# Patient Record
Sex: FEMALE | Race: OTHER | NOT HISPANIC OR LATINO | URBAN - METROPOLITAN AREA
[De-identification: names, ages, dates, MRNs, and addresses within clinical notes are randomized per-mention and may not be internally consistent; named-entity substitution may affect disease eponyms.]

---

## 2022-01-24 ENCOUNTER — OUTPATIENT (OUTPATIENT)
Dept: OUTPATIENT SERVICES | Facility: HOSPITAL | Age: 77
LOS: 1 days | End: 2022-01-24

## 2022-01-24 DIAGNOSIS — Z98.89 OTHER SPECIFIED POSTPROCEDURAL STATES: Chronic | ICD-10-CM

## 2022-01-24 PROCEDURE — 70486 CT MAXILLOFACIAL W/O DYE: CPT | Mod: 26

## 2022-01-24 PROCEDURE — 71045 X-RAY EXAM CHEST 1 VIEW: CPT | Mod: 26

## 2022-01-24 PROCEDURE — 93010 ELECTROCARDIOGRAM REPORT: CPT

## 2022-01-24 PROCEDURE — 99285 EMERGENCY DEPT VISIT HI MDM: CPT

## 2022-01-24 PROCEDURE — 70450 CT HEAD/BRAIN W/O DYE: CPT | Mod: 26

## 2022-01-25 ENCOUNTER — OUTPATIENT (OUTPATIENT)
Dept: OUTPATIENT SERVICES | Facility: HOSPITAL | Age: 77
LOS: 1 days | End: 2022-01-25

## 2022-01-25 ENCOUNTER — INPATIENT (INPATIENT)
Facility: HOSPITAL | Age: 77
LOS: 0 days | Discharge: HOME | End: 2022-01-25
Payer: MEDICARE

## 2022-01-25 DIAGNOSIS — Z98.89 OTHER SPECIFIED POSTPROCEDURAL STATES: Chronic | ICD-10-CM

## 2022-01-25 PROCEDURE — 70551 MRI BRAIN STEM W/O DYE: CPT | Mod: 26

## 2022-01-25 PROCEDURE — 70544 MR ANGIOGRAPHY HEAD W/O DYE: CPT | Mod: 26,59

## 2022-01-28 DIAGNOSIS — F03.90 UNSPECIFIED DEMENTIA, UNSPECIFIED SEVERITY, WITHOUT BEHAVIORAL DISTURBANCE, PSYCHOTIC DISTURBANCE, MOOD DISTURBANCE, AND ANXIETY: ICD-10-CM

## 2022-01-28 DIAGNOSIS — H53.2 DIPLOPIA: ICD-10-CM

## 2022-01-28 DIAGNOSIS — E78.5 HYPERLIPIDEMIA, UNSPECIFIED: ICD-10-CM

## 2022-01-28 DIAGNOSIS — I10 ESSENTIAL (PRIMARY) HYPERTENSION: ICD-10-CM

## 2022-01-28 DIAGNOSIS — Z20.822 CONTACT WITH AND (SUSPECTED) EXPOSURE TO COVID-19: ICD-10-CM

## 2022-01-28 DIAGNOSIS — H49.02 THIRD [OCULOMOTOR] NERVE PALSY, LEFT EYE: ICD-10-CM

## 2022-01-28 DIAGNOSIS — I48.91 UNSPECIFIED ATRIAL FIBRILLATION: ICD-10-CM

## 2022-02-18 DIAGNOSIS — H02.402 UNSPECIFIED PTOSIS OF LEFT EYELID: ICD-10-CM

## 2022-02-18 DIAGNOSIS — F03.90 UNSPECIFIED DEMENTIA, UNSPECIFIED SEVERITY, WITHOUT BEHAVIORAL DISTURBANCE, PSYCHOTIC DISTURBANCE, MOOD DISTURBANCE, AND ANXIETY: ICD-10-CM

## 2022-02-18 DIAGNOSIS — H53.2 DIPLOPIA: ICD-10-CM

## 2022-03-06 DIAGNOSIS — H02.402 UNSPECIFIED PTOSIS OF LEFT EYELID: ICD-10-CM

## 2022-03-06 DIAGNOSIS — H53.2 DIPLOPIA: ICD-10-CM

## 2022-03-06 DIAGNOSIS — F03.90 UNSPECIFIED DEMENTIA, UNSPECIFIED SEVERITY, WITHOUT BEHAVIORAL DISTURBANCE, PSYCHOTIC DISTURBANCE, MOOD DISTURBANCE, AND ANXIETY: ICD-10-CM

## 2022-03-29 ENCOUNTER — APPOINTMENT (OUTPATIENT)
Dept: NEUROSURGERY | Facility: CLINIC | Age: 77
End: 2022-03-29
Payer: MEDICARE

## 2022-03-29 ENCOUNTER — NON-APPOINTMENT (OUTPATIENT)
Age: 77
End: 2022-03-29

## 2022-03-29 VITALS
OXYGEN SATURATION: 97 % | DIASTOLIC BLOOD PRESSURE: 77 MMHG | TEMPERATURE: 98 F | WEIGHT: 145 LBS | HEART RATE: 67 BPM | SYSTOLIC BLOOD PRESSURE: 134 MMHG | BODY MASS INDEX: 28.47 KG/M2 | HEIGHT: 60 IN

## 2022-03-29 DIAGNOSIS — Z77.22 CONTACT WITH AND (SUSPECTED) EXPOSURE TO ENVIRONMENTAL TOBACCO SMOKE (ACUTE) (CHRONIC): ICD-10-CM

## 2022-03-29 DIAGNOSIS — Z72.89 OTHER PROBLEMS RELATED TO LIFESTYLE: ICD-10-CM

## 2022-03-29 DIAGNOSIS — Z86.39 PERSONAL HISTORY OF OTHER ENDOCRINE, NUTRITIONAL AND METABOLIC DISEASE: ICD-10-CM

## 2022-03-29 DIAGNOSIS — Z78.9 OTHER SPECIFIED HEALTH STATUS: ICD-10-CM

## 2022-03-29 DIAGNOSIS — Z87.891 PERSONAL HISTORY OF NICOTINE DEPENDENCE: ICD-10-CM

## 2022-03-29 DIAGNOSIS — Z83.438 FAMILY HISTORY OF OTHER DISORDER OF LIPOPROTEIN METABOLISM AND OTHER LIPIDEMIA: ICD-10-CM

## 2022-03-29 DIAGNOSIS — Z86.59 PERSONAL HISTORY OF OTHER MENTAL AND BEHAVIORAL DISORDERS: ICD-10-CM

## 2022-03-29 DIAGNOSIS — Z86.79 PERSONAL HISTORY OF OTHER DISEASES OF THE CIRCULATORY SYSTEM: ICD-10-CM

## 2022-03-29 DIAGNOSIS — Z82.49 FAMILY HISTORY OF ISCHEMIC HEART DISEASE AND OTHER DISEASES OF THE CIRCULATORY SYSTEM: ICD-10-CM

## 2022-03-29 DIAGNOSIS — Z83.511 FAMILY HISTORY OF GLAUCOMA: ICD-10-CM

## 2022-03-29 PROBLEM — Z00.00 ENCOUNTER FOR PREVENTIVE HEALTH EXAMINATION: Status: ACTIVE | Noted: 2022-03-29

## 2022-03-29 PROCEDURE — 99205 OFFICE O/P NEW HI 60 MIN: CPT

## 2022-03-29 RX ORDER — ROSUVASTATIN CALCIUM 10 MG/1
10 TABLET, FILM COATED ORAL
Refills: 0 | Status: ACTIVE | COMMUNITY

## 2022-03-29 RX ORDER — MEMANTINE HYDROCHLORIDE 10 MG/1
10 TABLET, FILM COATED ORAL
Refills: 0 | Status: ACTIVE | COMMUNITY

## 2022-03-29 RX ORDER — DONEPEZIL HYDROCHLORIDE 10 MG/1
10 TABLET ORAL
Refills: 0 | Status: ACTIVE | COMMUNITY

## 2022-03-29 RX ORDER — SERTRALINE HYDROCHLORIDE 50 MG/1
50 TABLET, FILM COATED ORAL
Refills: 0 | Status: ACTIVE | COMMUNITY

## 2022-03-29 RX ORDER — APIXABAN 5 MG/1
5 TABLET, FILM COATED ORAL
Refills: 0 | Status: ACTIVE | COMMUNITY

## 2022-03-29 RX ORDER — QUETIAPINE FUMARATE 400 MG/1
TABLET ORAL
Refills: 0 | Status: ACTIVE | COMMUNITY

## 2022-04-04 NOTE — REASON FOR VISIT
[Initial Evaluation] : an initial evaluation [Other: _____] : [unfilled] [FreeTextEntry1] : carotid cavernous fistula

## 2022-04-04 NOTE — HISTORY OF PRESENT ILLNESS
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  \par Past medical history includes AF on Eliquis, dementia, HLD.\par \par Patient presents is under the care of neurology - Dr. Hawk. for management of dementia and stroke management. \par Patient does not endorse any headaches, n/v.  No loss of strength or gait disturbances. \par Patient presented to [facility] 1/25/2022 with left eye ptosis- stroke workup initiated. No acute findings.\par  \par Patient was evaluated by ophthalmology ([place]) \par She is under the care of Dr. Durbin (cardiology) AF on Eliquis 5 mg BID 5+ years.  There is discussion of discontinuance per daughter. \par PMD- Chioma pinky- [facility]. \par Non smoker. \par Lurdes (daughter) [phone] for mc portions of history.

## 2022-04-04 NOTE — ASSESSMENT
[FreeTextEntry1] : Ms. Vanessa Hidalgo presents with the above history and imaging.  Patient is neurologically intact.  I have personally reviewed her MR imaging that reveals abnormal flow enhancement in the left cavernous sinus and petrosal sinus\par consistent with carotid cavernous fistula.\par I would recommend a diagnostic cerebral angiogram for characterization of her carotid cavernous fistula. \par  Continue BP medications as ordered to maintained BP <140/90. Continue statin therapy with an LDL goal of < 70 for secondary stroke prevention.\par Continue Elquis 5 mg BID as directed.\par Cardiac clearance\par PST's /COVID \par Patient has been given an opportunity to ask and have their questions answered to their satisfaction.\par Patient knows to call the office if there are any new or worsening symptoms.\par \par \par We discussed in great detail that cerebral angiogram is a minimally invasive diagnostic procedure to obtain detailed images of brain vessels such as very small arteries and veins. Risks and benefits discussed. Patient verbalizes understanding of the plan.  She has been given an opportunity to ask and have her questions answered.  She wishes to proceed with said procedure.\par \par I have seen and examined the patient along with my nurse practitioner, Ronna Perkins .  I have personally determined the assessment and plan of care based on today's encounter.\par

## 2022-04-04 NOTE — PHYSICAL EXAM
[General Appearance - Alert] : alert [General Appearance - In No Acute Distress] : in no acute distress [Oriented To Time, Place, And Person] : oriented to person, place, and time [Impaired Insight] : insight and judgment were intact [Affect] : the affect was normal [Person] : oriented to person [Place] : oriented to place [Time] : oriented to time [Concentration Intact] : normal concentrating ability [Visual Intact] : visual attention was ~T not ~L decreased [Naming Objects] : no difficulty naming common objects [Repeating Phrases] : no difficulty repeating a phrase [Writing A Sentence] : no difficulty writing a sentence [Fluency] : fluency intact [Comprehension] : comprehension intact [Reading] : reading intact [Past History] : adequate knowledge of personal past history [Cranial Nerves Optic (II)] : visual acuity intact bilaterally,  visual fields full to confrontation, pupils equal round and reactive to light [Cranial Nerves Trigeminal (V)] : facial sensation intact symmetrically [Cranial Nerves Facial (VII)] : face symmetrical [Cranial Nerves Glossopharyngeal (IX)] : tongue and palate midline [Cranial Nerves Accessory (XI - Cranial And Spinal)] : head turning and shoulder shrug symmetric [Cranial Nerves Hypoglossal (XII)] : there was no tongue deviation with protrusion [Motor Tone] : muscle tone was normal in all four extremities [Motor Strength] : muscle strength was normal in all four extremities [No Muscle Atrophy] : normal bulk in all four extremities [Sensation Tactile Decrease] : light touch was intact [Abnormal Walk] : normal gait [Balance] : balance was intact [2+] : Ankle jerk left 2+ [Past-pointing] : there was no past-pointing [Tremor] : no tremor present [Plantar Reflex Right Only] : normal on the right [Plantar Reflex Left Only] : normal on the left [FreeTextEntry5] : Mild ptosis left

## 2022-04-04 NOTE — DATA REVIEWED
[de-identified] : ACC: [id number] EXAM: MR ANGIO BRAIN\par ACC: [id number] EXAM: MR BRAIN\par \par PROCEDURE DATE: 01/25/2022\par \par \par \par INTERPRETATION: Clinical indications: Dizziness. Facial trauma.\par \par MRI of brain was performed using sagittal T1, axial T1 T2 T2 FLAIR diffusion and susceptibility weighted sequence.\par \par MRA of the [place] was performed using 3-D [place] technique. This exam is compared prior noncontrast head CT performed on January 24, 2022 and prior brain MRI performed on August 28, 2015.\par \par Parenchymal volume loss and chronic microvascular ischemic changes are identified.\par \par There is no evidence acute hemorrhage mass mass effect seen\par \par Evaluation of the diffusion weighted sequence demonstrates no abnormal areas of restricted diffusion to suggest acute infarct.\par \par The large vessels demonstrate normal flow voids\par \par Left maxillary polyp versus retention cyst is seen.\par \par Both mastoid and middle ear regions appear clear.\par \par MRA of the [place] is limited by motion. Grossly, both distal internal carotid, anterior middle cerebral and posterior cerebral arteries appear normal. 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 [de-identified] : DATE OF EXAM: 03/11/2022 MITCHELL Pressley Name: [place] [facility] MITCHELL Pressley Address: [address] [place] [address] Darien Lopez R. Phys. Phone: [phone] MRA-HEAD PRE AND POST IV CONTRAST  HISTORY: Left-sided stenosis  TECHNIQUE: MRA of the head was performed with and without administration of 12 cc Gadoterate Meglumine. Diffusion images were obtained through the entire brain. MIP, subvolume MIP and source images were all reviewed. Study was performed on a 3 Domi ultra high field wide bore magnet.  No prior studies are available for comparison with this examination.  FINDINGS:  3-D time-of-flight MRA images demonstrates abnormal flow enhancement in the left cavernous sinus and petrosal sinus. Contrast-enhanced MRA images demonstrates early contrast enhancement of the left cavernous sinus. Findings are consistent with carotid cavernous fistula.  Intracranial segments of both internal carotid arteries are patent without significant stenosis. Both anterior cerebral arteries and middle cerebral arteries are patent without significant stenosis.  V4 segment of vertebral arteries are patent without stenosis. Right vertebral artery terminates with PICA. Basilar artery is patent without significant stenosis. There is fetal origin of bilateral posterior cerebral arteries. Both cerebral arteries are patent without significant stenosis.  Diffusion images do not demonstrate any acute or evolving infarct.  IMPRESSION:   Abnormal flow enhancement in the left cavernous sinus and petrosal sinus consistent with carotid cavernous fistula. Recommend conventional angiography to further evaluate.  Report will be called in to referring physician by Nirav Rios.  Signed by: Tila Walker MD Signed Date: 3/11/2022 6:10 PM EST  SIGNED BY: Tila Walker M.D., Ext. [phone] 03/11/2022 06:10 PM IMPRESSION:   Abnormal flow enhancement in the left cavernous sinus and petrosal sinus consistent with carotid cavernous fistula. Recommend conventional angiography to further evaluate.

## 2022-04-04 NOTE — END OF VISIT
[FreeTextEntry3] : Will follow plan as detail above. These low flow fistulas most of the time resolve on their on with no need for endovascular intervention. Angiography is important to confirm presence of the lesion and for further characterization.  [Time Spent: ___ minutes] : I have spent [unfilled] minutes of time on the encounter.

## 2022-04-22 ENCOUNTER — OUTPATIENT (OUTPATIENT)
Dept: OUTPATIENT SERVICES | Facility: HOSPITAL | Age: 77
LOS: 1 days | End: 2022-04-22
Payer: MEDICARE

## 2022-04-22 VITALS
TEMPERATURE: 98 F | SYSTOLIC BLOOD PRESSURE: 116 MMHG | HEART RATE: 64 BPM | OXYGEN SATURATION: 96 % | RESPIRATION RATE: 20 BRPM | WEIGHT: 138.89 LBS | DIASTOLIC BLOOD PRESSURE: 73 MMHG | HEIGHT: 60 IN

## 2022-04-22 DIAGNOSIS — Z91.89 OTHER SPECIFIED PERSONAL RISK FACTORS, NOT ELSEWHERE CLASSIFIED: ICD-10-CM

## 2022-04-22 DIAGNOSIS — E78.5 HYPERLIPIDEMIA, UNSPECIFIED: ICD-10-CM

## 2022-04-22 DIAGNOSIS — G30.9 ALZHEIMER'S DISEASE, UNSPECIFIED: ICD-10-CM

## 2022-04-22 DIAGNOSIS — Z29.9 ENCOUNTER FOR PROPHYLACTIC MEASURES, UNSPECIFIED: ICD-10-CM

## 2022-04-22 DIAGNOSIS — I10 ESSENTIAL (PRIMARY) HYPERTENSION: ICD-10-CM

## 2022-04-22 DIAGNOSIS — I48.91 UNSPECIFIED ATRIAL FIBRILLATION: ICD-10-CM

## 2022-04-22 DIAGNOSIS — Z01.818 ENCOUNTER FOR OTHER PREPROCEDURAL EXAMINATION: ICD-10-CM

## 2022-04-22 DIAGNOSIS — Z98.89 OTHER SPECIFIED POSTPROCEDURAL STATES: Chronic | ICD-10-CM

## 2022-04-22 DIAGNOSIS — Z86.79 PERSONAL HISTORY OF OTHER DISEASES OF THE CIRCULATORY SYSTEM: ICD-10-CM

## 2022-04-22 LAB
ALBUMIN SERPL ELPH-MCNC: 4.2 G/DL — SIGNIFICANT CHANGE UP (ref 3.3–5.2)
ALP SERPL-CCNC: 135 U/L — HIGH (ref 40–120)
ALT FLD-CCNC: 11 U/L — SIGNIFICANT CHANGE UP
ANION GAP SERPL CALC-SCNC: 10 MMOL/L — SIGNIFICANT CHANGE UP (ref 5–17)
APTT BLD: 41.1 SEC — HIGH (ref 27.5–35.5)
AST SERPL-CCNC: 18 U/L — SIGNIFICANT CHANGE UP
BASOPHILS # BLD AUTO: 0.05 K/UL — SIGNIFICANT CHANGE UP (ref 0–0.2)
BASOPHILS NFR BLD AUTO: 0.9 % — SIGNIFICANT CHANGE UP (ref 0–2)
BILIRUB SERPL-MCNC: 0.3 MG/DL — LOW (ref 0.4–2)
BLD GP AB SCN SERPL QL: SIGNIFICANT CHANGE UP
BUN SERPL-MCNC: 19.2 MG/DL — SIGNIFICANT CHANGE UP (ref 8–20)
CALCIUM SERPL-MCNC: 9.7 MG/DL — SIGNIFICANT CHANGE UP (ref 8.6–10.2)
CHLORIDE SERPL-SCNC: 105 MMOL/L — SIGNIFICANT CHANGE UP (ref 98–107)
CO2 SERPL-SCNC: 29 MMOL/L — SIGNIFICANT CHANGE UP (ref 22–29)
CREAT SERPL-MCNC: 1.03 MG/DL — SIGNIFICANT CHANGE UP (ref 0.5–1.3)
EGFR: 57 ML/MIN/1.73M2 — LOW
EOSINOPHIL # BLD AUTO: 0.38 K/UL — SIGNIFICANT CHANGE UP (ref 0–0.5)
EOSINOPHIL NFR BLD AUTO: 7 % — HIGH (ref 0–6)
GLUCOSE SERPL-MCNC: 106 MG/DL — HIGH (ref 70–99)
HCT VFR BLD CALC: 41.1 % — SIGNIFICANT CHANGE UP (ref 34.5–45)
HGB BLD-MCNC: 13.7 G/DL — SIGNIFICANT CHANGE UP (ref 11.5–15.5)
IMM GRANULOCYTES NFR BLD AUTO: 0 % — SIGNIFICANT CHANGE UP (ref 0–1.5)
INR BLD: 1.43 RATIO — HIGH (ref 0.88–1.16)
LYMPHOCYTES # BLD AUTO: 1.83 K/UL — SIGNIFICANT CHANGE UP (ref 1–3.3)
LYMPHOCYTES # BLD AUTO: 33.8 % — SIGNIFICANT CHANGE UP (ref 13–44)
MCHC RBC-ENTMCNC: 28.7 PG — SIGNIFICANT CHANGE UP (ref 27–34)
MCHC RBC-ENTMCNC: 33.3 GM/DL — SIGNIFICANT CHANGE UP (ref 32–36)
MCV RBC AUTO: 86.2 FL — SIGNIFICANT CHANGE UP (ref 80–100)
MONOCYTES # BLD AUTO: 0.36 K/UL — SIGNIFICANT CHANGE UP (ref 0–0.9)
MONOCYTES NFR BLD AUTO: 6.7 % — SIGNIFICANT CHANGE UP (ref 2–14)
MRSA PCR RESULT.: SIGNIFICANT CHANGE UP
NEUTROPHILS # BLD AUTO: 2.79 K/UL — SIGNIFICANT CHANGE UP (ref 1.8–7.4)
NEUTROPHILS NFR BLD AUTO: 51.6 % — SIGNIFICANT CHANGE UP (ref 43–77)
PLATELET # BLD AUTO: 220 K/UL — SIGNIFICANT CHANGE UP (ref 150–400)
POTASSIUM SERPL-MCNC: 4.8 MMOL/L — SIGNIFICANT CHANGE UP (ref 3.5–5.3)
POTASSIUM SERPL-SCNC: 4.8 MMOL/L — SIGNIFICANT CHANGE UP (ref 3.5–5.3)
PROT SERPL-MCNC: 6.9 G/DL — SIGNIFICANT CHANGE UP (ref 6.6–8.7)
PROTHROM AB SERPL-ACNC: 16.6 SEC — HIGH (ref 10.5–13.4)
RBC # BLD: 4.77 M/UL — SIGNIFICANT CHANGE UP (ref 3.8–5.2)
RBC # FLD: 12.8 % — SIGNIFICANT CHANGE UP (ref 10.3–14.5)
S AUREUS DNA NOSE QL NAA+PROBE: SIGNIFICANT CHANGE UP
SODIUM SERPL-SCNC: 144 MMOL/L — SIGNIFICANT CHANGE UP (ref 135–145)
WBC # BLD: 5.41 K/UL — SIGNIFICANT CHANGE UP (ref 3.8–10.5)
WBC # FLD AUTO: 5.41 K/UL — SIGNIFICANT CHANGE UP (ref 3.8–10.5)

## 2022-04-22 PROCEDURE — 71046 X-RAY EXAM CHEST 2 VIEWS: CPT | Mod: 26

## 2022-04-22 PROCEDURE — 93010 ELECTROCARDIOGRAM REPORT: CPT

## 2022-04-22 PROCEDURE — 93005 ELECTROCARDIOGRAM TRACING: CPT

## 2022-04-22 PROCEDURE — G0463: CPT

## 2022-04-22 PROCEDURE — 71046 X-RAY EXAM CHEST 2 VIEWS: CPT

## 2022-04-22 NOTE — H&P PST ADULT - PROBLEM SELECTOR PLAN 4
cerebral angiogram with moderate sedation with Dr. Gwen Ashraf secondary to personal history of other diseases of the circulatory system on 4/28/22.

## 2022-04-22 NOTE — H&P PST ADULT - ASSESSMENT
75 y/o female presents to PST pending cerebral angiogram with moderate sedation with Dr. Gwen Ashraf secondary to personal history of other diseases of the circulatory system on 22. Pt. with history of former tobacco use,  afib on eliquis, HLD, HTN, alzheimer's dementia as per  who is assisting with history today on exam. History of glaucoma. As per chart pt. was referred for neurosurgical evaluation for "Carotid cavernous distula."  states 2 months ago her eye closed-pt. was sent to ER, CT unable to be completed in hospital, she went to eye DrVeena thereafter who also referred for CT, as per chart MR angio of brain on 22 revealed "no acute territorial infarcts seen," MRA head pre/post IV contrast on 3/11/22 revealed  as per chart "abnormal flow enhancement in the left cavernous sinus and petrosal sinus consistent with carotid cavernous fistula."  they went to see the PCP and they were referred to surgeon who recommended aforementioned procedure. Eye has since opened. Denies HA, dizziness, lightheadedness or blurred vision. BP controlled on exam today. Pt. is not consistently reliable historian on exam secondary to dementia,  not consistently reliable historian of details regarding pt.    Pt. to have cardiac clearance with Dr. Nunez, seen 22, clearance obtained and placed on chart. pt. and  verbalized agreement and understanding.   Patient educated on surgical scrub, COVID testing, preadmission instructions,  and day of procedure medications as per policy, and pt. and  verbalized agreement and understanding.   Pt. advised to continue eliquis for procedure as per surgeon, email sent to ASHLEY Friend to confirm this instruction, pt. and  verbalized agreement and understanding.   Pt instructed to stop vitamins/supplements/herbal medications/NSAIDS for one week prior to surgery as of today pt. and  verbalized agreement and understanding.   Pt. educated and instructed regarding all preoperative instructions and education as per policy via both verbal and written means of communication and pt. verbalized agreement and understanding.    CAPRINI SCORE    AGE RELATED RISK FACTORS                                                             [ ] Age 41-60 years                                            (1 Point)  [ ] Age: 61-74 years                                           (2 Points)                 [x ] Age= 75 years                                                (3 Points)             DISEASE RELATED RISK FACTORS                                                       [ ] Edema in the lower extremities                 (1 Point)                     [ ] Varicose veins                                               (1 Point)                                 [x ] BMI > 25 Kg/m2                                            (1 Point)                                  [ ] Serious infection (ie PNA)                            (1 Point)                     [ ] Lung disease ( COPD, Emphysema)            (1 Point)                                                                          [ ] Acute myocardial infarction                         (1 Point)                  [ ] Congestive heart failure (in the previous month)  (1 Point)         [ ] Inflammatory bowel disease                            (1 Point)                  [ ] Central venous access, PICC or Port               (2 points)       (within the last month)                                                                [ ] Stroke (in the previous month)                        (5 Points)    [ ] Previous or present malignancy                       (2 points)                                                                                                                                                         HEMATOLOGY RELATED FACTORS                                                         [ ] Prior episodes of VTE                                     (3 Points)                     [ ] Positive family history for VTE                      (3 Points)                  [ ] Prothrombin 47452 A                                     (3 Points)                     [ ] Factor V Leiden                                                (3 Points)                        [ ] Lupus anticoagulants                                      (3 Points)                                                           [ ] Anticardiolipin antibodies                              (3 Points)                                                       [ ] High homocysteine in the blood                   (3 Points)                                             [ ] Other congenital or acquired thrombophilia      (3 Points)                                                [ ] Heparin induced thrombocytopenia                  (3 Points)                                        MOBILITY RELATED FACTORS  [ ] Bed rest                                                         (1 Point)  [ ] Plaster cast                                                    (2 points)  [ ] Bed bound for more than 72 hours           (2 Points)    GENDER SPECIFIC FACTORS  [ ] Pregnancy or had a baby within the last month   (1 Point)  [ ] Post-partum < 6 weeks                                   (1 Point)  [ ] Hormonal therapy  or oral contraception   (1 Point)  [ ] History of pregnancy complications              (1 point)  [ ] Unexplained or recurrent              (1 Point)    OTHER RISK FACTORS                                           (1 Point)  [ ] BMI >40, smoking, diabetes requiring insulin, chemotherapy  blood transfusions and length of surgery over 2 hours    SURGERY RELATED RISK FACTORS  [ ]  Section within the last month     (1 Point)  [ ] Minor surgery                                                  (1 Point)  [ ] Arthroscopic surgery                                       (2 Points)  [x ] Planned major surgery lasting more            (2 Points)      than 45 minutes     [ ] Elective hip or knee joint replacement       (5 points)       surgery                                                TRAUMA RELATED RISK FACTORS  [ ] Fracture of the hip, pelvis, or leg                       (5 Points)  [ ] Spinal cord injury resulting in paralysis             (5 points)       (in the previous month)    [ ] Paralysis  (less than 1 month)                             (5 Points)  [ ] Multiple Trauma within 1 month                        (5 Points)    Total Score [    6    ]    Caprini Score 0-2: Low Risk, NO VTE prophylaxis required for most patients, encourage ambulation  Caprini Score 3-6: Moderate Risk , pharmacologic VTE prophylaxis is indicated for most patients (in the absence of contraindications)  Caprini Score Greater than or =7: High risk, pharmocologic VTE prophylaxis indicated for most patients (in the absence of contraindications)    OPIOID RISK TOOL    MERRY EACH BOX THAT APPLIES AND ADD TOTALS AT THE END    FAMILY HISTORY OF SUBSTANCE ABUSE                 FEMALE         MALE                                                Alcohol                             [  ]1 pt          [  ]3pts                                               Illegal Durgs                     [  ]2 pts        [  ]3pts                                               Rx Drugs                           [  ]4 pts        [  ]4 pts    PERSONAL HISTORY OF SUBSTANCE ABUSE                                                                                          Alcohol                             [  ]3 pts       [  ]3 pts                                               Illegal Drugs                     [  ]4 pts        [  ]4 pts                                               Rx Drugs                           [  ]5 pts        [  ]5 pts    AGE BETWEEN 16-45 YEARS                                      [  ]1 pt         [  ]1 pt    HISTORY OF PREADOLESCENT   SEXUAL ABUSE                                                             [  ]3 pts        [  ]0pts    PSYCHOLOGICAL DISEASE                     ADD, OCD, Bipolar, Schizophrenia        [  ]2 pts         [  ]2 pts                      Depression                                               [  ]1 pt           [  ]1 pt           SCORING TOTAL   (add numbers and type here)              (0)                                     A score of 3 or lower indicated LOW risk for future opioid abuse  A score of 4 to 7 indicated moderate risk for future opioid abuse  A score of 8 or higher indicates a high risk for opioid abuse

## 2022-04-22 NOTE — H&P PST ADULT - PRO TOBACCO TYPE
quit 10 years ago she believes, states she was smoking on and off for 15 years, intermittently/cigarettes

## 2022-04-22 NOTE — ASU PATIENT PROFILE, ADULT - PATIENT KNOW
[facility] Onc Nurse Msg Pool             Patient has been approved for [facility]L      Writer called to schedule the patient for the approved lab however she did not answer. Left a message to call back.   
PSR LM for patient to call back.  
PSR, please call patient to schedule lab only appt.   
yes

## 2022-04-22 NOTE — H&P PST ADULT - RADIOLOGY RESULTS AND INTERPRETATION
CXR pending, pt. given script to go to [facility] radiology for CXR, pt. and  verbalized agreement and understanding .

## 2022-04-22 NOTE — H&P PST ADULT - NEGATIVE ENMT SYMPTOMS
no hearing difficulty/no ear pain/no tinnitus/no vertigo/no nasal congestion/no nasal discharge/no nasal obstruction/no nose bleeds/no recurrent cold sores/no abnormal taste sensation/no gum bleeding/no dry mouth/no throat pain/no dysphagia

## 2022-04-22 NOTE — H&P PST ADULT - HISTORY OF PRESENT ILLNESS
77 y/o female presents to PST pending cerebral angiogram with moderate sedation with Dr. Gwen Ashraf secondary to personal history of other diseases of the circulatory system on 4/28/22. Pt. with history of former tobacco use,  afib on eliquis, HLD, HTN, alzheimer's dementia as per  who is assisting with history today on exam. History of glaucoma. As per chart pt. was referred for neurosurgical evaluation for "Carotid cavernous distula."  states 2 months ago her eye closed-pt. was sent to ER, CT unable to be completed in hospital, she went to eye Dr. thereafter who also referred for CT, as per chart MR angio of brain on 1/25/22 revealed "no acute territorial infarcts seen," MRA head pre/post IV contrast on 3/11/22 revealed  as per chart "abnormal flow enhancement in the left cavernous sinus and petrosal sinus consistent with carotid cavernous fistula."  they went to see the PCP and they were referred to surgeon who recommended aforementioned procedure. Eye has since opened. Denies HA, dizziness, lightheadedness or blurred vision.

## 2022-04-22 NOTE — H&P PST ADULT - HEALTH CARE MAINTENANCE
Colonoscopy/EGD - unsure of when, states a while ago, uncertain, advised to follow up with PCP and pt. and  agreed.

## 2022-04-22 NOTE — H&P PST ADULT - NSICDXPASTMEDICALHX_GEN_ALL_CORE_FT
PAST MEDICAL HISTORY:  Alzheimer's dementia     Atrial fibrillation     Breast nodule right, benign per     COVID-19 vaccine series completed     Glaucoma     Hyperlipidemia     Hypertension     Personal history of other diseases of the circulatory system

## 2022-04-22 NOTE — H&P PST ADULT - PROBLEM SELECTOR PROBLEM 7
Ms. Carter is an 89 yo BF with HTN that [place] [facility] [place] have [place]opal [place] at home. She says that she remembers walking from her room to the table for breakfast. She also reports remembering not feeding herself, but her daughter fed her everything and she ate it. She says that the next thing that she remembers is waking up with several men around her. She remembers hearing some of the conversation, but she was not talking much at that time. Ultimately, she was back to her normal self by the time she arrived at the ED. She reports having a cold for the last week and has been taking mucinex liquid at home for it. She had subjective fever until a couple days ago. Also had decreased appetite and oral intake over this time frame. She says that she had loose stools since starting the mucinex. She denies any chest pain or palpitations.   
Hyperlipidemia

## 2022-04-22 NOTE — H&P PST ADULT - NSICDXFAMILYHX_GEN_ALL_CORE_FT
FAMILY HISTORY:  Child  Still living? Yes, Estimated age: Age Unknown  Family history of arrhythmia, Age at diagnosis: Age Unknown

## 2022-04-28 ENCOUNTER — OUTPATIENT (OUTPATIENT)
Dept: OUTPATIENT SERVICES | Facility: HOSPITAL | Age: 77
LOS: 1 days | End: 2022-04-28
Payer: MEDICARE

## 2022-04-28 ENCOUNTER — APPOINTMENT (OUTPATIENT)
Dept: NEUROSURGERY | Facility: HOSPITAL | Age: 77
End: 2022-04-28

## 2022-04-28 ENCOUNTER — TRANSCRIPTION ENCOUNTER (OUTPATIENT)
Age: 77
End: 2022-04-28

## 2022-04-28 VITALS
DIASTOLIC BLOOD PRESSURE: 79 MMHG | SYSTOLIC BLOOD PRESSURE: 146 MMHG | OXYGEN SATURATION: 98 % | HEART RATE: 67 BPM | RESPIRATION RATE: 16 BRPM

## 2022-04-28 VITALS
RESPIRATION RATE: 16 BRPM | OXYGEN SATURATION: 99 % | HEART RATE: 75 BPM | SYSTOLIC BLOOD PRESSURE: 164 MMHG | TEMPERATURE: 98 F | DIASTOLIC BLOOD PRESSURE: 75 MMHG

## 2022-04-28 DIAGNOSIS — Z86.79 PERSONAL HISTORY OF OTHER DISEASES OF THE CIRCULATORY SYSTEM: ICD-10-CM

## 2022-04-28 DIAGNOSIS — Z98.89 OTHER SPECIFIED POSTPROCEDURAL STATES: Chronic | ICD-10-CM

## 2022-04-28 DIAGNOSIS — R55 SYNCOPE AND COLLAPSE: ICD-10-CM

## 2022-04-28 LAB — ABO RH CONFIRMATION: SIGNIFICANT CHANGE UP

## 2022-04-28 PROCEDURE — 36227 PLACE CATH XTRNL CAROTID: CPT

## 2022-04-28 PROCEDURE — 76377 3D RENDER W/INTRP POSTPROCES: CPT

## 2022-04-28 PROCEDURE — 36224 PLACE CATH CAROTD ART: CPT

## 2022-04-28 PROCEDURE — C1769: CPT

## 2022-04-28 PROCEDURE — 36415 COLL VENOUS BLD VENIPUNCTURE: CPT

## 2022-04-28 PROCEDURE — 76377 3D RENDER W/INTRP POSTPROCES: CPT | Mod: 26

## 2022-04-28 PROCEDURE — C1894: CPT

## 2022-04-28 PROCEDURE — 36224 PLACE CATH CAROTD ART: CPT | Mod: 50

## 2022-04-28 PROCEDURE — C1887: CPT

## 2022-04-28 PROCEDURE — 36226 PLACE CATH VERTEBRAL ART: CPT

## 2022-04-28 PROCEDURE — 36226 PLACE CATH VERTEBRAL ART: CPT | Mod: 50

## 2022-04-28 PROCEDURE — C1760: CPT

## 2022-04-28 RX ORDER — SODIUM CHLORIDE 9 MG/ML
3 INJECTION INTRAMUSCULAR; INTRAVENOUS; SUBCUTANEOUS ONCE
Refills: 0 | Status: COMPLETED | OUTPATIENT
Start: 2022-04-28 | End: 2022-04-28

## 2022-04-28 RX ORDER — SODIUM CHLORIDE 9 MG/ML
1000 INJECTION INTRAMUSCULAR; INTRAVENOUS; SUBCUTANEOUS
Refills: 0 | Status: DISCONTINUED | OUTPATIENT
Start: 2022-04-28 | End: 2022-05-12

## 2022-04-28 RX ADMIN — SODIUM CHLORIDE 100 MILLILITER(S): 9 INJECTION INTRAMUSCULAR; INTRAVENOUS; SUBCUTANEOUS at 10:10

## 2022-04-28 RX ADMIN — SODIUM CHLORIDE 3 MILLILITER(S): 9 INJECTION INTRAMUSCULAR; INTRAVENOUS; SUBCUTANEOUS at 13:56

## 2022-04-28 NOTE — DISCHARGE NOTE NURSING/CASE MANAGEMENT/SOCIAL WORK - NSDCPEFALRISK_GEN_ALL_CORE
For information on Fall & Injury Prevention, visit: [email or web address] OR  [email or web address] OR  https://www.cdc.gov/steadi/patient.html

## 2022-04-28 NOTE — CHART NOTE - NSCHARTNOTEFT_GEN_A_CORE
Neurointerventional Surgery Post Procedure Note    Procedure: Selective Cerebral Angiography     Pre- Procedure Diagnosis: possible fistula   Post- Procedure Diagnosis: cavernous sinus fistula     : Dr. Ld MD  Physician Assistant: Lissa Crandall PA-C  Nurse: Rosa Zamora RN, Jada Juares RN  Anesthesiologist: Dr. Dami ORTIZ  Radiology Tech: Tyron Nunez RT, Miller Barlow RT, Chriss Estrada RT    Sheath:  5 Surinamese Sheath    I/Os: estimated blood loss less than 10cc,  IV fluids 500cc, Contrast: Omnipaque 240  155 cc    Vitals:  /73   HR 73  Spo2 100 %    Preliminary Report:  Under a 5 Surinamese long sheath via the right groin under MAC sedation via left vertebral artery, left internal carotid artery, left external carotid artery, right vertebral artery, right internal carotid artery, right external carotid artery, a selective cerebral angiography  was performed and reveals       . ( Official note to follow).    Patient tolerated procedure well.  Patient remains hemodynamically stable, no change in neurological status compared to baseline.  Results were discussed with patient, patient's family and Neurosurgery.  Right groin sheath  was discontinued. Hemostasis was obtained with approximately 10 minutes of manual compression.     No active bleeding, no hematoma, no ecchymosis.   Quick clot and safeguard balloon dressing applied at 1005.  Patient transferred to recovery room in stable condition. Neurointerventional Surgery Post Procedure Note    Procedure: Selective Cerebral Angiography     Pre- Procedure Diagnosis: possible fistula   Post- Procedure Diagnosis: cavernous sinus fistula     : Dr. Ld MD  Physician Assistant: Lissa Crandall PA-C  Nurse: Rosa Zamora RN, Jada Juares RN  Anesthesiologist: Dr. Dallas Dillard MD  Radiology Tech: Tyron Nunez RT, Miller Barlow RT, Chriss Estrada RT    Sheath:  5 Ivorian Sheath    I/Os: estimated blood loss less than 10cc,  IV fluids 500cc, Contrast: Omnipaque 240  155 cc    Vitals:  /73   HR 73  Spo2 100 %    Preliminary Report:  Under a 5 Ivorian long sheath via the right groin under MAC sedation via left vertebral artery, left internal carotid artery, left external carotid artery, right vertebral artery, right internal carotid artery, right external carotid artery, a selective cerebral angiography  was performed and reveals cavernous sinus fistula. ( Official note to follow).    Patient tolerated procedure well.  Patient remains hemodynamically stable, no change in neurological status compared to baseline.  Results were discussed with patient, patient's family and Neurosurgery.  Right groin sheath  was discontinued. Hemostasis was obtained with approximately 10 minutes of manual compression.     No active bleeding, no hematoma, no ecchymosis.   Quick clot and safeguard balloon dressing applied at 1005.  Patient transferred to recovery room in stable condition.

## 2022-04-28 NOTE — ASU DISCHARGE PLAN (ADULT/PEDIATRIC) - CARE PROVIDER_API CALL
Gwen Solo)  Neurology; Vascular Neurology  [address] [place] [address]  Phone: [phone]  Fax: [phone]  Follow Up Time: 1 week

## 2022-04-28 NOTE — CHART NOTE - NSCHARTNOTEFT_GEN_A_CORE
Neurointerventional Surgery  Pre-Procedure Note     This is a 76y ____ hand dominant Female    HPI:  77 yo F     77 y/o female presents to PST pending cerebral angiogram with moderate sedation with Dr. Gwen Ashraf secondary to personal history of other diseases of the circulatory system on 22. Pt. with history of former tobacco use,  afib on eliquis, HLD, HTN, alzheimer's dementia as per  who is assisting with history today on exam. History of glaucoma. As per chart pt. was referred for neurosurgical evaluation for "Carotid cavernous distula."  states 2 months ago her eye closed-pt. was sent to ER, CT unable to be completed in hospital, she went to eye DrVeena thereafter who also referred for CT, as per chart MR angio of brain on 22 revealed "no acute territorial infarcts seen," MRA head pre/post IV contrast on 3/11/22 revealed  as per chart "abnormal flow enhancement in the left cavernous sinus and petrosal sinus consistent with carotid cavernous fistula."  they went to see the PCP and they were referred to surgeon who recommended aforementioned procedure. Eye has since opened. Denies HA, dizziness, lightheadedness or blurred vision.       Allergies: No Known Allergies      PAST MEDICAL & SURGICAL HISTORY:  Atrial fibrillation  Hyperlipidemia  Hypertension  Glaucoma  COVID-19 vaccine series completed  Alzheimer's dementia  Personal history of other diseases of the circulatory system  Breast nodule, right, benign per   H/O:       FAMILY HISTORY:  Family history of arrhythmia (Child)        Current Medications:   sodium chloride 0.9% lock flush 3 milliLiter(s) IV Push Once      Physical Exam:  Constitutional: NAD  Neuro  * Mental Status:  GCS 15:  E(4), V(5), M(6).  Awake, alert, oriented x2 (name, place). No aphasia or dysarthria. Able to name objects and their use.   * Cranial Nerves: Cnii-Cnxii grossly intact. PERRL, EOMI, tongue midline, no gaze deviation  * Motor: RUE 5/5, LUE 5/5, RLE 5/5, LLE 5/5, no drift or dysmetria   * Sensory: Sensation intact to light touch  * Reflexes: Not assessed  * Gait: Not assessed  Cardiovascular:  Regular rate and rhythm.  Eyes: See neurologic examination with detailed examination of eyes.  ENT: No JVD, Trachea Midline.  Respiratory: non labored breathing   Gastrointestinal: Soft, nontender, nondistended.  Genitourinary: [ ] Dennis, [ x ] No Dennis.   Musculoskeletal: No muscle wasting noted, (See neurologic assessment for full muscle strength assessment) No pretibial edema appreciated, no appreciable calf tenderness.  Skin: no wounds, abrasions   Hematologic / Lymph / Immunologic: No bleeding from IV sites or wounds, No lymphadenopathy, No Hives or allergic type skin lesions      [facility] SS:  DATE: 22  TIME: 820  1A: Level of consciousness (0-3): 0  1B: Questions (0-2): 2  1C: Commands (0-2): 0  2: Gaze (0-2): 0  3: Visual fields (0-3): 0  4: Facial palsy (0-3): 0  MOTOR:  5A: Left arm motor drift (0-4): 0  5B: Right arm motor drift (0-4): 0  6A: Left leg motor drift (0-4): 0  6B: Right leg motor drift (0-4): 0  7: Limb ataxia (0-2): 0  SENSORY:  8: Sensation (0-2): 0  SPEECH:  9: Language (0-3): 0  10: Dysarthria (0-2): 0  EXTINCTION:  11: Extinction/inattention (0-2): 0    TOTAL SCORE: 2      Labs:                       Blood Bank:         Assessment/Plan:   This is a 76y  year old right / left hand dominant Female  presents with   Patient presents to neuro-IR for selective cerebral angiography.     Procedure, goals, risks, benefits and alternatives  were discussed with patient and (patient's family).  All questions were answered.  Risks include but are not limited to stroke, vessel injury, hemorrhage, and or groin hematoma.  Patient demonstrates understanding  of all risks involved with this procedure and wishes to continue.   Appropriate  consent was obtained from patient and consent is in the patient's chart. Neurointerventional Surgery  Pre-Procedure Note     This is a 76y 4 hand dominant Female    HPI:  75 yo F with PMH Afib on Eliquis, HLD, HTN, dementia, who presents today for cerebral angiogram. Per patient and family, patient experienced intermittent L eye drooping x2 months ago. Patient originally presented to ED, but eventually had MRI / MRA performed as outpatient which showed possible carotid cavernous fistula. Patient presents today for cerebral angiogram 2/2 MRA findings. Patient reports she feels fine today, has no acute complaints, but patient is demented and needs occasional re-orientation.       Allergies: No Known Allergies      PAST MEDICAL & SURGICAL HISTORY:  Atrial fibrillation  Hyperlipidemia  Hypertension  Glaucoma  COVID-19 vaccine series completed  Alzheimer's dementia  Personal history of other diseases of the circulatory system  Breast nodule, right, benign per   H/O:       FAMILY HISTORY:  Family history of arrhythmia (Child)      Current Medications:   sodium chloride 0.9% lock flush 3 milliLiter(s) IV Push Once      Physical Exam:  Constitutional: NAD  Neuro  * Mental Status:  GCS 15:  E(4), V(5), M(6).  Awake, alert, oriented x2 (name, place) but not to month, age. No aphasia or dysarthria. Able to name objects and their function.  * Cranial Nerves: Cnii-Cnxii grossly intact. PERRL, EOMI, tongue midline, no gaze deviation  * Motor: RUE 5/5, LUE 5/5, RLE 5/5, LLE 5/5, no drift or dysmetria   * Sensory: Sensation intact to light touch  * Reflexes: Not assessed  * Gait: Not assessed  Cardiovascular:  Regular rate and rhythm.  Eyes: See neurologic examination with detailed examination of eyes.  ENT: No JVD, Trachea Midline.  Respiratory: non labored breathing   Gastrointestinal: Soft, nontender, nondistended.  Genitourinary: [ ] Dennis, [ x ] No Dennis.   Musculoskeletal: No muscle wasting noted, (See neurologic assessment for full muscle strength assessment) No pretibial edema appreciated, no appreciable calf tenderness.  Skin: no wounds, abrasions   Hematologic / Lymph / Immunologic: No bleeding from IV sites or wounds, No lymphadenopathy, No Hives or allergic type skin lesions      [facility] SS:  DATE: 22  TIME: 820  1A: Level of consciousness (0-3): 0  1B: Questions (0-2): 2  1C: Commands (0-2): 0  2: Gaze (0-2): 0  3: Visual fields (0-3): 0  4: Facial palsy (0-3): 0  MOTOR:  5A: Left arm motor drift (0-4): 0  5B: Right arm motor drift (0-4): 0  6A: Left leg motor drift (0-4): 0  6B: Right leg motor drift (0-4): 0  7: Limb ataxia (0-2): 0  SENSORY:  8: Sensation (0-2): 0  SPEECH:  9: Language (0-3): 0  10: Dysarthria (0-2): 0  EXTINCTION:  11: Extinction/inattention (0-2): 0    TOTAL SCORE: 2      Labs:   22: WBC 5.4, Hgb 13.7, Hct 41.1, platelets 220  22: Na 144, K 4.8, Cl 105, CO2 29, BUN 19.2, Cr 1.03, glucose 106  22: INR 1.43, PTT 41.1      Assessment/Plan:   This is a 76y  year old right hand dominant Female  presents with possible carotid fistula. Patient presents to neuro-IR for selective cerebral angiography.     Procedure, goals, risks, benefits and alternatives  were discussed with patient and patient's  Jose Maria who is at bedside.  All questions were answered.  Risks include but are not limited to stroke, vessel injury, hemorrhage, and or groin hematoma.  Patient demonstrates understanding  of all risks involved with this procedure and wishes to continue.   Appropriate  consent was obtained from patient and patient's  and consent is in the patient's chart.

## 2022-04-28 NOTE — DISCHARGE NOTE NURSING/CASE MANAGEMENT/SOCIAL WORK - PATIENT PORTAL LINK FT
You can access the FollowMyHealth Patient Portal offered by [facility] by registering at the following website: [email or web address]. By joining [employer]’s FollowMyHealth portal, you will also be able to view your health information using other applications (apps) compatible with our system.

## 2022-05-02 PROBLEM — Z86.79 PERSONAL HISTORY OF OTHER DISEASES OF THE CIRCULATORY SYSTEM: Chronic | Status: ACTIVE | Noted: 2022-04-22

## 2022-05-02 PROBLEM — G30.9 ALZHEIMER'S DISEASE, UNSPECIFIED: Chronic | Status: ACTIVE | Noted: 2022-04-22

## 2022-05-02 PROBLEM — H40.9 UNSPECIFIED GLAUCOMA: Chronic | Status: ACTIVE | Noted: 2022-04-22

## 2022-05-02 PROBLEM — Z92.29 PERSONAL HISTORY OF OTHER DRUG THERAPY: Chronic | Status: ACTIVE | Noted: 2022-04-22

## 2022-05-02 PROBLEM — N63.0 UNSPECIFIED LUMP IN UNSPECIFIED BREAST: Chronic | Status: ACTIVE | Noted: 2022-04-22

## 2022-05-10 ENCOUNTER — APPOINTMENT (OUTPATIENT)
Dept: NEUROSURGERY | Facility: CLINIC | Age: 77
End: 2022-05-10
Payer: MEDICARE

## 2022-05-10 VITALS
HEIGHT: 63 IN | DIASTOLIC BLOOD PRESSURE: 66 MMHG | BODY MASS INDEX: 24.8 KG/M2 | HEART RATE: 78 BPM | SYSTOLIC BLOOD PRESSURE: 100 MMHG | OXYGEN SATURATION: 95 % | TEMPERATURE: 98.4 F | WEIGHT: 140 LBS

## 2022-05-10 PROCEDURE — 99215 OFFICE O/P EST HI 40 MIN: CPT

## 2022-05-11 NOTE — PHYSICAL EXAM
[General Appearance - Alert] : alert [General Appearance - In No Acute Distress] : in no acute distress [Oriented To Time, Place, And Person] : oriented to person, place, and time [Impaired Insight] : insight and judgment were intact [Affect] : the affect was normal [Person] : oriented to person [Place] : oriented to place [Time] : oriented to time [Concentration Intact] : normal concentrating ability [Visual Intact] : visual attention was ~T not ~L decreased [Naming Objects] : no difficulty naming common objects [Repeating Phrases] : no difficulty repeating a phrase [Writing A Sentence] : no difficulty writing a sentence [Fluency] : fluency intact [Comprehension] : comprehension intact [Reading] : reading intact [Past History] : adequate knowledge of personal past history [Cranial Nerves Optic (II)] : visual acuity intact bilaterally,  visual fields full to confrontation, pupils equal round and reactive to light [Cranial Nerves Facial (VII)] : face symmetrical [Cranial Nerves Trigeminal (V)] : facial sensation intact symmetrically [Cranial Nerves Glossopharyngeal (IX)] : tongue and palate midline [Cranial Nerves Accessory (XI - Cranial And Spinal)] : head turning and shoulder shrug symmetric [Cranial Nerves Hypoglossal (XII)] : there was no tongue deviation with protrusion [Motor Tone] : muscle tone was normal in all four extremities [Motor Strength] : muscle strength was normal in all four extremities [No Muscle Atrophy] : normal bulk in all four extremities [Sensation Tactile Decrease] : light touch was intact [Abnormal Walk] : normal gait [Balance] : balance was intact [2+] : Ankle jerk left 2+ [Past-pointing] : there was no past-pointing [Tremor] : no tremor present [Plantar Reflex Right Only] : normal on the right [Plantar Reflex Left Only] : normal on the left [FreeTextEntry5] : Mild ptosis left

## 2022-05-11 NOTE — END OF VISIT
[Time Spent: ___ minutes] : I have spent [unfilled] minutes of time on the encounter. [FreeTextEntry3] : Will follow plan as detail above. These low flow fistulas most of the time resolve on their on with no need for endovascular intervention. Angiography is important to confirm presence of the lesion and for further characterization.

## 2022-05-11 NOTE — REASON FOR VISIT
[Follow-Up: _____] : a [unfilled] follow-up visit [Initial Evaluation] : an initial evaluation [Family Member] : family member [Other: _____] : [unfilled] [FreeTextEntry1] : carotid cavernous fistula

## 2022-05-11 NOTE — ASSESSMENT
[FreeTextEntry1] : Ms. Vanessa Hidalgo presents with the above history and imaging.  Patient is neurologically intact.  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   Recommend treatment to prevent progression. \par \par Patient will continue follow up with cardiology for determination if she remains on Eliquis.\par Neuro opthalmology evaluation recommend to establish baseline.\par Will plan for treatment upon completion of cardiac workup. \par Patient has been given an opportunity to ask and have their questions answered to their satisfaction.\par Patient knows to call the office if there are any new or worsening symptoms.\par \par \par We discussed in great detail that cerebral angiogram for treatment of carotid cavernous fistula.  is a minimally invasive diagnostic procedure to obtain detailed images of brain vessels such as very small arteries and veins. Risks and benefits discussed. Patient verbalizes understanding of the plan.  She has been given an opportunity to ask and have her questions answered.  She wishes to proceed with said procedure.\par \par I have seen and examined the patient along with my nurse practitioner, Ronna Perkins .  I have personally determined the assessment and plan of care based on today's encounter.\par

## 2022-05-11 NOTE — HISTORY OF PRESENT ILLNESS
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  \par Past medical history includes AF on Eliquis, dementia, HLD.\par \par Patient presents is under the care of neurology - Dr. Hawk. for management of dementia and stroke management. \par Patient does not endorse any headaches, n/v.  No loss of strength or gait disturbances. \par Patient presented to [facility] 1/25/2022 with left eye ptosis- stroke workup initiated. No acute findings.\par  \par Patient was evaluated by ophthalmology ([place]) \par She is under the care of Dr. Durbin (cardiology) AF on Eliquis 5 mg BID 5+ years.  There is discussion of discontinuance per daughter.  She is wearing a Holter monitor for 4 weeks to assess if there is  a role for discontinuation of her Eliquis for AF. \par PMD- [facility]. \par \par \par Patient has not been taking her Eliquis per cardiology.  She denies any pain referable to groin site.  Denies any headaches, n/v or vision changes.  No new episodes of left eye ptosis.  Denies any other neurological complaints.Memory is stable. \par Non smoker. \par Lurdes (daughter) [phone] present for visit.

## 2022-05-11 NOTE — DATA REVIEWED
[de-identified] : ACC: [id number] EXAM: MR ANGIO BRAIN\par ACC: [id number] EXAM: MR BRAIN\par \par PROCEDURE DATE: 01/25/2022\par \par \par \par INTERPRETATION: Clinical indications: Dizziness. Facial trauma.\par \par MRI of brain was performed using sagittal T1, axial T1 T2 T2 FLAIR diffusion and susceptibility weighted sequence.\par \par MRA of the [place] was performed using 3-D [place] technique. This exam is compared prior noncontrast head CT performed on January 24, 2022 and prior brain MRI performed on August 28, 2015.\par \par Parenchymal volume loss and chronic microvascular ischemic changes are identified.\par \par There is no evidence acute hemorrhage mass mass effect seen\par \par Evaluation of the diffusion weighted sequence demonstrates no abnormal areas of restricted diffusion to suggest acute infarct.\par \par The large vessels demonstrate normal flow voids\par \par Left maxillary polyp versus retention cyst is seen.\par \par Both mastoid and middle ear regions appear clear.\par \par MRA of the [place] is limited by motion. Grossly, both distal internal carotid, anterior middle cerebral and posterior cerebral arteries appear normal. 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 [de-identified] : fistula  Procedure: Cerebral angiography  Interventionalist: Gwen Jaffe MD  Assistant: Tyron Mccarthy RT  Anesthesiologist: Dallas Dillard MD  Contrast: Omnipaque 240, 155 cc  Radiation Dose: A: 17.6 min, 1172 mGy B: 12.6 min, 189.8 mGy Total: 30.2 min, 1362 mGy  Indications: The patient is a 76 years old female with past medical history of atrial fibrillation on Eliquis, hyperlipidemia, hypertension and mild dementia, who experienced intermittent left eye ptosis for 2 months. Patient was being followed by Neurologist who ordered and MRI of the brain and MR angiography concerning for possible carotid cavernous fistula. The patient now presents for cerebral angiography. The risks, benefits, alternatives, complications and personnel associated with the procedure was discussed with the patient and the patient's family in great detail. They request that we proceed.  Procedure: The patient was brought into the angiography suite and positioned on the table supine. Both femoral regions were prepped and draped in the standard sterile fashion. The skin overlying the right femoral artery was infiltrated with lidocaine and accessed using a micropuncture kit and modified Seldinger technique. A 5 [place] long sheath was inserted. A 5 [place] Cordis Vert diagnostic catheter over an angled Glidewire was navigated into the right internal and external carotid artery and angiography of the intracranial and extracranial circulation was performed. A rotational angiogram was performed of the right external carotid circulation. Three-dimensional images were evaluated and interpreted on an independent PathableP workstation. The catheter was then navigated into the right vertebral artery and angiography of the intracranial circulation was performed. The catheter was then navigated into the left internal and external carotid artery and angiography of the intracranial and extracranial circulation was performed. A rotational angiogram was performed of the left external carotid circulation. Three-dimensional images were evaluated and interpreted on an independent EndorphMeWP workstation. Lastly the catheter was navigated into the left vertebral artery and angiography of the intracranial circulation was performed. A right femoral angiogram was performed through the guide sheath. The right femoral artery was deemed of sufficient caliber for a femoral access closure device.   All catheters and guidewires were removed and hemostasis was obtained with a Vascade 5 Libyan, manual compression, Quickclot and the Safeguard dressing  Findings:  Right internal and external carotid artery including 3-D interpretation: Immediate arteriovenous shunting is visualized from the meningohypophyseal trunk emptying directly into the cavernous sinus. There is also some contribution from the inferolateral trunk into the cavernous sinus fistula. No cortical venous reflux is appreciated. The opthalmic vein drains into the facial vein. There is no filling across the anterior communicating artery. A fetal origin of the right posterior cerebral artery is identified. There is no evidence of intracranial aneurysm. Multiple superficial cortical veins are identified. The superior sagittal sinus drains into both transverse and sigmoid sinuses. The basal vein of Jack is identified. The internal cerebral vein drains into the great vein of Torito and the straight sinus. The sylvian venous system drains via middle superficial cerebral vein into the cavernous, pterygoid venous plexus and inferior petrosal sinus. There is immediate arteriovenous shunting from middle meningeal (sphenoidal branches), accessory middle meningeal, sphenopalatine artery (artery of foramen rotundum) and ascending pharyngeal being the main contributors to the cavernous sinus fistula.  Right vertebral artery: The right vertebral artery is hypoplastic and basically ends in right posterior inferior cerebellar artery.  Left internal and external carotid artery including 3-D interpretation: Immediate arteriovenous shunting is visualized from the meningohypophyseal trunk emptying directly into the cavernous sinus. There is some reflux into the superior petrosal sinus with early filling or the transverse-sigmoid sinus. There is no filling across the anterior communicating artery. A fetal origin of the left posterior cerebral artery is identified. There is no evidence of intracranial aneurysm. Multiple superficial cortical veins are identified. The superior sagittal sinus drains into both transverse and sigmoid sinuses. The vein of Essie is visualized. The basal vein of Jack is identified. The internal cerebral vein drains into the great vein of Torito and the straight sinus. The sylvian venous system drains via middle superficial cerebral vein into the cavernous, pterygoid venous plexus and inferior petrosal sinus. There is immediate arteriovenous shunting from middle meningeal (sphenoidal branches), accessory middle meningeal and sphenopalatine artery (artery of foramen rotundum) being the main contributors to the cavernous sinus fistula.  Left vertebral artery: There is normal transit of contrast through the arterial, capillary and venous phases. There is no reflux into the contralateral vertebral artery. The basilar artery terminates in a hypoplastic right P1 segment and the left superior cerebellar artery. The posterior communicating arteries were not opacified. There is no evidence of intracranial aneurysm, arteriovenous malformation or arteriovenous shunting. The posterior circulation drains into the straight sinus and both transverse and sigmoid sinuses.  Right femoral artery: There is normal transit of contrast through the right femoral artery without evidence of contrast extravasation, pseudoaneurysm or vascular injury.   Impression: Carotid cavernous sinus fistula [de-identified] : DATE OF EXAM: 03/11/2022 [facility] Press[facility] Name: [place] MITCHELL Pressley Address: [address] [place] [address] Darien Lopez R. Phys. Phone: [phone] MRA-HEAD PRE AND POST IV CONTRAST  HISTORY: Left-sided stenosis  TECHNIQUE: MRA of the head was performed with and without administration of 12 cc Gadoterate Meglumine. Diffusion images were obtained through the entire brain. MIP, subvolume MIP and source images were all reviewed. Study was performed on a 3 Domi ultra high field wide bore magnet.  No prior studies are available for comparison with this examination.  FINDINGS:  3-D time-of-flight MRA images demonstrates abnormal flow enhancement in the left cavernous sinus and petrosal sinus. Contrast-enhanced MRA images demonstrates early contrast enhancement of the left cavernous sinus. Findings are consistent with carotid cavernous fistula.  Intracranial segments of both internal carotid arteries are patent without significant stenosis. Both anterior cerebral arteries and middle cerebral arteries are patent without significant stenosis.  V4 segment of vertebral arteries are patent without stenosis. Right vertebral artery terminates with PICA. Basilar artery is patent without significant stenosis. There is fetal origin of bilateral posterior cerebral arteries. Both cerebral arteries are patent without significant stenosis.  Diffusion images do not demonstrate any acute or evolving infarct.  IMPRESSION:   Abnormal flow enhancement in the left cavernous sinus and petrosal sinus consistent with carotid cavernous fistula. Recommend conventional angiography to further evaluate.  Report will be called in to referring physician by Nirav Rios.  Signed by: Tila Walker MD Signed Date: 3/11/2022 6:10 PM EST  SIGNED BY: Tila Walker M.D., Ext. [phone] 03/11/2022 06:10 PM IMPRESSION:   Abnormal flow enhancement in the left cavernous sinus and petrosal sinus consistent with carotid cavernous fistula. Recommend conventional angiography to further evaluate.

## 2022-05-17 ENCOUNTER — APPOINTMENT (OUTPATIENT)
Dept: OPHTHALMOLOGY | Facility: CLINIC | Age: 77
End: 2022-05-17
Payer: MEDICARE

## 2022-05-17 ENCOUNTER — NON-APPOINTMENT (OUTPATIENT)
Age: 77
End: 2022-05-17

## 2022-05-17 PROCEDURE — 99204 OFFICE O/P NEW MOD 45 MIN: CPT

## 2022-06-21 ENCOUNTER — OUTPATIENT (OUTPATIENT)
Dept: OUTPATIENT SERVICES | Facility: HOSPITAL | Age: 77
LOS: 1 days | End: 2022-06-21
Payer: MEDICARE

## 2022-06-21 VITALS
HEIGHT: 61 IN | TEMPERATURE: 99 F | DIASTOLIC BLOOD PRESSURE: 63 MMHG | HEART RATE: 69 BPM | WEIGHT: 134.04 LBS | OXYGEN SATURATION: 96 % | SYSTOLIC BLOOD PRESSURE: 101 MMHG | RESPIRATION RATE: 18 BRPM

## 2022-06-21 DIAGNOSIS — Z98.89 OTHER SPECIFIED POSTPROCEDURAL STATES: Chronic | ICD-10-CM

## 2022-06-21 DIAGNOSIS — I77.0 ARTERIOVENOUS FISTULA, ACQUIRED: ICD-10-CM

## 2022-06-21 DIAGNOSIS — I10 ESSENTIAL (PRIMARY) HYPERTENSION: ICD-10-CM

## 2022-06-21 DIAGNOSIS — F03.90 UNSPECIFIED DEMENTIA WITHOUT BEHAVIORAL DISTURBANCE: ICD-10-CM

## 2022-06-21 DIAGNOSIS — Z01.818 ENCOUNTER FOR OTHER PREPROCEDURAL EXAMINATION: ICD-10-CM

## 2022-06-21 LAB
ANION GAP SERPL CALC-SCNC: 10 MMOL/L — SIGNIFICANT CHANGE UP (ref 5–17)
BLD GP AB SCN SERPL QL: NEGATIVE — SIGNIFICANT CHANGE UP
BUN SERPL-MCNC: 21 MG/DL — SIGNIFICANT CHANGE UP (ref 7–23)
CALCIUM SERPL-MCNC: 9.9 MG/DL — SIGNIFICANT CHANGE UP (ref 8.4–10.5)
CHLORIDE SERPL-SCNC: 105 MMOL/L — SIGNIFICANT CHANGE UP (ref 96–108)
CO2 SERPL-SCNC: 27 MMOL/L — SIGNIFICANT CHANGE UP (ref 22–31)
CREAT SERPL-MCNC: 1.05 MG/DL — SIGNIFICANT CHANGE UP (ref 0.5–1.3)
EGFR: 55 ML/MIN/1.73M2 — LOW
GLUCOSE SERPL-MCNC: 95 MG/DL — SIGNIFICANT CHANGE UP (ref 70–99)
HCT VFR BLD CALC: 41.7 % — SIGNIFICANT CHANGE UP (ref 34.5–45)
HGB BLD-MCNC: 13.5 G/DL — SIGNIFICANT CHANGE UP (ref 11.5–15.5)
MCHC RBC-ENTMCNC: 27.3 PG — SIGNIFICANT CHANGE UP (ref 27–34)
MCHC RBC-ENTMCNC: 32.4 GM/DL — SIGNIFICANT CHANGE UP (ref 32–36)
MCV RBC AUTO: 84.2 FL — SIGNIFICANT CHANGE UP (ref 80–100)
NRBC # BLD: 0 /100 WBCS — SIGNIFICANT CHANGE UP (ref 0–0)
PLATELET # BLD AUTO: 183 K/UL — SIGNIFICANT CHANGE UP (ref 150–400)
POTASSIUM SERPL-MCNC: 5.2 MMOL/L — SIGNIFICANT CHANGE UP (ref 3.5–5.3)
POTASSIUM SERPL-SCNC: 5.2 MMOL/L — SIGNIFICANT CHANGE UP (ref 3.5–5.3)
RBC # BLD: 4.95 M/UL — SIGNIFICANT CHANGE UP (ref 3.8–5.2)
RBC # FLD: 13.2 % — SIGNIFICANT CHANGE UP (ref 10.3–14.5)
RH IG SCN BLD-IMP: POSITIVE — SIGNIFICANT CHANGE UP
SODIUM SERPL-SCNC: 142 MMOL/L — SIGNIFICANT CHANGE UP (ref 135–145)
WBC # BLD: 6.65 K/UL — SIGNIFICANT CHANGE UP (ref 3.8–10.5)
WBC # FLD AUTO: 6.65 K/UL — SIGNIFICANT CHANGE UP (ref 3.8–10.5)

## 2022-06-21 PROCEDURE — 86901 BLOOD TYPING SEROLOGIC RH(D): CPT

## 2022-06-21 PROCEDURE — 86900 BLOOD TYPING SEROLOGIC ABO: CPT

## 2022-06-21 PROCEDURE — 85027 COMPLETE CBC AUTOMATED: CPT

## 2022-06-21 PROCEDURE — G0463: CPT

## 2022-06-21 PROCEDURE — 80048 BASIC METABOLIC PNL TOTAL CA: CPT

## 2022-06-21 PROCEDURE — 86850 RBC ANTIBODY SCREEN: CPT

## 2022-06-21 RX ORDER — ERGOCALCIFEROL 1.25 MG/1
0 CAPSULE ORAL
Qty: 0 | Refills: 0 | DISCHARGE

## 2022-06-21 NOTE — H&P PST ADULT - PROBLEM SELECTOR PLAN 1
Scheduled for cerebral angiogram and embolization   preop op instruction discussed pt and her family verbalized understanding

## 2022-06-21 NOTE — H&P PST ADULT - BSA (M2)
History and Physical    Patient: Jenny Marie MRN: [id number]  SSN: [id number]    YOB: 1967  Age: 49 y.o. Sex: female      Subjective:      Jenny Marie is a 49 y.o. female who presents today for reevaluation and MRI review of right knee. Patient rates pain as 0/10 today. Pain has been present since 2021. She was working in the yard and noted a pain later that day. She had to hop and use a cane for a couple days following the onset of pain. Notes that the pain does come and go but describes a sharp sensation accompanied with popping in the knee. Has back and side knee pain. Pt also c/o of right foot pain near the ball of her foot. This pain has been present for around a year. Foot started feeling achy and sore and has been present intermittently. Patient denies any fever, chills, chest pain, shortness of breath or calf pain. The remainder of the review of systems is negative. There are no new illness or injuries to report since last seen in the office. There are no changes to medications, allergies, family or social history. .     Past Medical History:   Diagnosis Date    Anemia     GERD (gastroesophageal reflux disease)     Submucous leiomyoma of uterus      Past Surgical History:   Procedure Laterality Date    HX  SECTION      HX  SECTION      HX GYN      HX WISDOM TEETH EXTRACTION        Family History   Problem Relation Age of Onset    Cancer Mother     Diabetes Father      Social History     Tobacco Use    Smoking status: Never Smoker    Smokeless tobacco: Never Used   Substance Use Topics    Alcohol use: Never      Prior to Admission medications    Medication Sig Start Date End Date Taking? Authorizing Provider   meloxicam (Mobic) 15 mg tablet Take 1 Tablet by mouth daily (with breakfast). 21  Yes [place], Cheryle Birks, PA   celecoxib (CeleBREX) 200 mg capsule Take 1 Capsule by mouth two (2) times a day.  21  Yes Henri Dickerson MD   biotin 10,000 mcg TbDi biotin 10,000 mcg disintegrating tablet   Take by oral route. Yes Provider, Historical   ascorbic acid, vitamin C, (VITAMIN C) 500 mg tablet Vitamin C 500 mg tablet   Take by oral route. Yes Provider, Historical   black cohosh 540 mg cap black cohosh 540 mg capsule   Take by oral route. Yes Provider, Historical   cholecalciferol (VITAMIN D3) 25 mcg (1,000 unit) cap Take  by mouth daily. Yes Provider, Historical   naproxen sodium (ALEVE) 220 mg cap Take  by mouth as needed. Yes Provider, Historical   ferrous sulfate 325 mg (65 mg iron) cpER Take 6 Tabs by mouth daily. Yes Provider, Historical   zinc 50 mg tab tablet Take  by mouth daily. Yes Provider, Historical   Cyanocobalamin-Cobamamide (B12) 5,000-100 mcg lozg by SubLINGual route two (2) times a day. Yes Provider, Historical   omeprazole (PRILOSEC OTC) 20 mg tablet Take 20 mg by mouth daily. Yes Provider, Historical   loratadine (CLARITIN) 10 mg tablet Take 10 mg by mouth daily. Yes Provider, Historical        No Known Allergies    Review of Systems:  A comprehensive review of systems was negative except for that written in the History of Present Illness. Objective:     Vitals:    09/30/21 0930   Pulse: 87   Temp: 96.9 °F (36.1 °C)   TempSrc: Temporal   SpO2: 97%   Weight: 189 lb (85.7 kg)   Height: 5' 3\" (1.6 m)        Physical Exam:  General:  Alert, cooperative, no distress, appears stated age. Eyes:  Conjunctivae/corneas clear. PERRL, EOMs intact. Fundi benign   Ears:  Normal TMs and external ear canals both ears. Nose: Nares normal. Septum midline. Mucosa normal. No drainage or sinus tenderness. Mouth/Throat: Lips, mucosa, and tongue normal. Teeth and gums normal.   Neck: Supple, symmetrical, trachea midline, no adenopathy, thyroid: no enlargment/tenderness/nodules, no carotid bruit and no JVD. Back:   Symmetric, no curvature. ROM normal. No CVA tenderness.    Lungs:   Clear to auscultation bilaterally. Heart:  Regular rate and rhythm, S1, S2 normal, no murmur, click, rub or gallop. Abdomen:   Soft, non-tender. Bowel sounds normal. No masses,  No organomegaly. Extremities: Extremities normal, atraumatic, no cyanosis or edema. Pulses: 2+ and symmetric all extremities. Skin: Skin color, texture, turgor normal. No rashes or lesions   Lymph nodes: Cervical, supraclavicular, and axillary nodes normal.   Neurologic: CNII-XII intact. Normal strength, sensation and reflexes throughout. Assessment:     Hospital Problems  Date Reviewed: 8/30/2021    None          Plan:     1. I discussed the risks and benefits and potential adverse outcomes of both operative vs non operative treatment of right medial meniscus tear with the patient and patient wishes to proceed with arthroscopic medial meniscectomy. Risks of operative intervention include but not limited to bleeding, infection, deep vein thrombosis, pulmonary embolism, death, limb length discrepancy, reflexive sympathetic dystrophy, fat embolism syndrome,damage to blood vessels and nerves, malunion, non-union, delayed union, failure of hardware, post traumatic arthritis, stroke, heart attack, and death. Patient understands that infection may arise and may require numerous surgeries. The patient was counseled at length about the risks of johnny Covid-19 during their perioperative period and any recovery window from their procedure. The patient was made aware that johnny Covid-19  may worsen their prognosis for recovering from their procedure and lend to a higher morbidity and/or mortality risk. All material risks, benefits, and reasonable alternatives including postponing the procedure were discussed. The patient does  wish to proceed with the procedure at this time. History and physical exam to be preformed at a later date. For her right foot pain, I would like her to follow up with Dr. Mark Valero.   Risk factors include: BMI>30  2. No ultrasound exam indicated today  3. No cortisone injection indicated today   4. No Physical/Occupational Therapy indicated today  5. No diagnostic test indicated today:   6. No durable medical equipment indicated today  7. No referral indicated today   8. No medications indicated today:   9. No Narcotic indicated today       Scribed by Pratik [facility]) as dictated by Praful Bradley MD    I, Dr. Praful Bradley, confirm that all documentation is accurate.     Praful Bradley M.D.   [facility] 1.59

## 2022-06-21 NOTE — H&P PST ADULT - CARDIOVASCULAR
Ok prednisone 20 mg tabs 1 daily for 5 days  5 tabs    Mal thank you    Bala    regular rate and rhythm negative

## 2022-06-21 NOTE — H&P PST ADULT - HISTORY OF PRESENT ILLNESS
76 year old poor history female with h/o cognitive impairment, accompanied with her , presents for preop testing for scheduled cerebral angiogram and embolization. Medical information obtained from Pt's daughter- Lurdes over the phone, she reports that back in January 2022, her mother's eyelid involuntary closed, she was complaining of blurry vision. 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 Pt denies any headaches, since her eyelid open, no N/V, no blurry vision. She is former smoker, h/o HTN, paroxysmal Afib (was on Eliquis until 2 months ago, unknown last echo, but will be evaluated by her cardiologist prior to surgery), hyperlipidemia, dementia, previous covid-19 infection (11/2021, mild symptoms, no hospitalization).  She denies any symptoms of covid-19, and scheduled for PCR test on 6/25/2022 at [facility].

## 2022-06-21 NOTE — H&P PST ADULT - NSICDXPASTMEDICALHX_GEN_ALL_CORE_FT
PAST MEDICAL HISTORY:  Alzheimer's dementia     Atrial fibrillation     Breast nodule right, benign per     COVID-19 vaccine series completed     Glaucoma     Hyperlipidemia     Hypertension     Personal history of other diseases of the circulatory system      PAST MEDICAL HISTORY:  2019 novel coronavirus disease (COVID-19) 11/2021   mild symptoms, no hospitalization    Alzheimer's dementia     Atrial fibrillation paroxysmal Afib-   was on Eliquis until 2 months ago per pt's daughter    Breast nodule right, benign per     Carotid AV fistula     COVID-19 vaccine series completed     Glaucoma     Hyperlipidemia     Hypertension     Personal history of other diseases of the circulatory system

## 2022-06-28 ENCOUNTER — TRANSCRIPTION ENCOUNTER (OUTPATIENT)
Age: 77
End: 2022-06-28

## 2022-06-28 ENCOUNTER — APPOINTMENT (OUTPATIENT)
Dept: NEUROSURGERY | Facility: HOSPITAL | Age: 77
End: 2022-06-28

## 2022-06-28 ENCOUNTER — INPATIENT (INPATIENT)
Facility: HOSPITAL | Age: 77
LOS: 0 days | Discharge: HOME | DRG: 272 | End: 2022-06-29
Attending: NEUROLOGICAL SURGERY | Admitting: NEUROLOGICAL SURGERY
Payer: MEDICARE

## 2022-06-28 VITALS
HEIGHT: 64 IN | OXYGEN SATURATION: 99 % | RESPIRATION RATE: 18 BRPM | HEART RATE: 56 BPM | WEIGHT: 119.93 LBS | TEMPERATURE: 98 F | SYSTOLIC BLOOD PRESSURE: 139 MMHG | DIASTOLIC BLOOD PRESSURE: 73 MMHG

## 2022-06-28 DIAGNOSIS — I77.0 ARTERIOVENOUS FISTULA, ACQUIRED: ICD-10-CM

## 2022-06-28 DIAGNOSIS — Z98.89 OTHER SPECIFIED POSTPROCEDURAL STATES: Chronic | ICD-10-CM

## 2022-06-28 LAB
HCT VFR BLD CALC: 35.6 % — SIGNIFICANT CHANGE UP (ref 34.5–45)
HGB BLD-MCNC: 12 G/DL — SIGNIFICANT CHANGE UP (ref 11.5–15.5)
MAGNESIUM SERPL-MCNC: 1.8 MG/DL — SIGNIFICANT CHANGE UP (ref 1.6–2.6)
MCHC RBC-ENTMCNC: 27.5 PG — SIGNIFICANT CHANGE UP (ref 27–34)
MCHC RBC-ENTMCNC: 33.7 GM/DL — SIGNIFICANT CHANGE UP (ref 32–36)
MCV RBC AUTO: 81.5 FL — SIGNIFICANT CHANGE UP (ref 80–100)
NRBC # BLD: 0 /100 WBCS — SIGNIFICANT CHANGE UP (ref 0–0)
PHOSPHATE SERPL-MCNC: 4.9 MG/DL — HIGH (ref 2.5–4.5)
PLATELET # BLD AUTO: 165 K/UL — SIGNIFICANT CHANGE UP (ref 150–400)
RBC # BLD: 4.37 M/UL — SIGNIFICANT CHANGE UP (ref 3.8–5.2)
RBC # FLD: 13.3 % — SIGNIFICANT CHANGE UP (ref 10.3–14.5)
WBC # BLD: 7.82 K/UL — SIGNIFICANT CHANGE UP (ref 3.8–10.5)
WBC # FLD AUTO: 7.82 K/UL — SIGNIFICANT CHANGE UP (ref 3.8–10.5)

## 2022-06-28 PROCEDURE — 99291 CRITICAL CARE FIRST HOUR: CPT

## 2022-06-28 PROCEDURE — 36012 PLACE CATHETER IN VEIN: CPT | Mod: LT

## 2022-06-28 PROCEDURE — 36218 PLACE CATHETER IN ARTERY: CPT

## 2022-06-28 PROCEDURE — 36217 PLACE CATHETER IN ARTERY: CPT

## 2022-06-28 PROCEDURE — 61624 TCAT PERM OCCLS/EMBOLJ CNS: CPT

## 2022-06-28 PROCEDURE — ZZZZZ: CPT

## 2022-06-28 PROCEDURE — 76377 3D RENDER W/INTRP POSTPROCES: CPT | Mod: 26

## 2022-06-28 PROCEDURE — 75898 FOLLOW-UP ANGIOGRAPHY: CPT | Mod: 26

## 2022-06-28 PROCEDURE — 75894 X-RAYS TRANSCATH THERAPY: CPT | Mod: 26

## 2022-06-28 PROCEDURE — 75860 VEIN X-RAY NECK: CPT | Mod: 26,59

## 2022-06-28 PROCEDURE — 36216 PLACE CATHETER IN ARTERY: CPT | Mod: 59

## 2022-06-28 RX ORDER — SERTRALINE 25 MG/1
50 TABLET, FILM COATED ORAL DAILY
Refills: 0 | Status: DISCONTINUED | OUTPATIENT
Start: 2022-06-28 | End: 2022-06-29

## 2022-06-28 RX ORDER — MEMANTINE HYDROCHLORIDE 10 MG/1
10 TABLET ORAL ONCE
Refills: 0 | Status: COMPLETED | OUTPATIENT
Start: 2022-06-28 | End: 2022-06-28

## 2022-06-28 RX ORDER — SODIUM CHLORIDE 9 MG/ML
1000 INJECTION INTRAMUSCULAR; INTRAVENOUS; SUBCUTANEOUS
Refills: 0 | Status: DISCONTINUED | OUTPATIENT
Start: 2022-06-28 | End: 2022-06-29

## 2022-06-28 RX ORDER — SERTRALINE 25 MG/1
1 TABLET, FILM COATED ORAL
Qty: 0 | Refills: 0 | DISCHARGE

## 2022-06-28 RX ORDER — MEMANTINE HYDROCHLORIDE 10 MG/1
1 TABLET ORAL
Qty: 0 | Refills: 0 | DISCHARGE

## 2022-06-28 RX ORDER — METOPROLOL TARTRATE 50 MG
25 TABLET ORAL DAILY
Refills: 0 | Status: DISCONTINUED | OUTPATIENT
Start: 2022-06-28 | End: 2022-06-29

## 2022-06-28 RX ORDER — METOPROLOL TARTRATE 50 MG
25 TABLET ORAL DAILY
Refills: 0 | Status: DISCONTINUED | OUTPATIENT
Start: 2022-06-28 | End: 2022-06-28

## 2022-06-28 RX ORDER — ONDANSETRON 8 MG/1
4 TABLET, FILM COATED ORAL ONCE
Refills: 0 | Status: COMPLETED | OUTPATIENT
Start: 2022-06-28 | End: 2022-06-28

## 2022-06-28 RX ORDER — QUETIAPINE FUMARATE 200 MG/1
25 TABLET, FILM COATED ORAL AT BEDTIME
Refills: 0 | Status: DISCONTINUED | OUTPATIENT
Start: 2022-06-28 | End: 2022-06-29

## 2022-06-28 RX ORDER — QUETIAPINE FUMARATE 200 MG/1
12.5 TABLET, FILM COATED ORAL ONCE
Refills: 0 | Status: DISCONTINUED | OUTPATIENT
Start: 2022-06-28 | End: 2022-06-28

## 2022-06-28 RX ORDER — DEXAMETHASONE 0.5 MG/5ML
4 ELIXIR ORAL EVERY 6 HOURS
Refills: 0 | Status: DISCONTINUED | OUTPATIENT
Start: 2022-06-28 | End: 2022-06-29

## 2022-06-28 RX ORDER — DONEPEZIL HYDROCHLORIDE 10 MG/1
10 TABLET, FILM COATED ORAL AT BEDTIME
Refills: 0 | Status: DISCONTINUED | OUTPATIENT
Start: 2022-06-28 | End: 2022-06-29

## 2022-06-28 RX ORDER — ATORVASTATIN CALCIUM 80 MG/1
40 TABLET, FILM COATED ORAL AT BEDTIME
Refills: 0 | Status: DISCONTINUED | OUTPATIENT
Start: 2022-06-28 | End: 2022-06-29

## 2022-06-28 RX ADMIN — ONDANSETRON 4 MILLIGRAM(S): 8 TABLET, FILM COATED ORAL at 23:41

## 2022-06-28 RX ADMIN — DONEPEZIL HYDROCHLORIDE 10 MILLIGRAM(S): 10 TABLET, FILM COATED ORAL at 23:41

## 2022-06-28 RX ADMIN — SERTRALINE 50 MILLIGRAM(S): 25 TABLET, FILM COATED ORAL at 23:41

## 2022-06-28 RX ADMIN — MEMANTINE HYDROCHLORIDE 10 MILLIGRAM(S): 10 TABLET ORAL at 23:42

## 2022-06-28 RX ADMIN — Medication 25 MILLIGRAM(S): at 23:41

## 2022-06-28 RX ADMIN — ATORVASTATIN CALCIUM 40 MILLIGRAM(S): 80 TABLET, FILM COATED ORAL at 23:41

## 2022-06-28 RX ADMIN — QUETIAPINE FUMARATE 25 MILLIGRAM(S): 200 TABLET, FILM COATED ORAL at 23:41

## 2022-06-28 RX ADMIN — Medication 4 MILLIGRAM(S): at 23:41

## 2022-06-28 RX ADMIN — SODIUM CHLORIDE 70 MILLILITER(S): 9 INJECTION INTRAMUSCULAR; INTRAVENOUS; SUBCUTANEOUS at 21:16

## 2022-06-28 NOTE — PROGRESS NOTE ADULT - SUBJECTIVE AND OBJECTIVE BOX
EVENTS:   No acute events overnight.    VITALS:  T(C): , Max: 36.5 (06-28-22 @ 13:22)  HR:  (56 - 77)  BP:  (139/73 - 139/73)  ABP:  (154/71 - 157/64)  RR:  (13 - 21)  SpO2:  (99% - 100%)  Wt(kg): --    MEDICATIONS:  dexAMETHasone  Injectable 4 milliGRAM(s) IV Push every 6 hours  sodium chloride 0.9%. 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     Neuro:  q1h NC  dexamethasone     CV:  SBP goal  home meds???    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     VITALS:  T(C): , Max: 36.5 (06-28-22 @ 13:22)  HR:  (56 - 77)  BP:  (139/73 - 139/73)  ABP:  (154/71 - 157/64)  RR:  (13 - 21)  SpO2:  (99% - 100%)  Wt(kg): --    MEDICATIONS:  dexAMETHasone  Injectable 4 milliGRAM(s) IV Push every 6 hours  sodium chloride 0.9%. 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     Neuro:  q1h NC  dexamethasone 4mg q6h  MRI w/wo in the AM     CV:  SBP goal 100-160  restart home meds     Pulm:  on RA    GI:  ADAT    Renal:  Na goal    Heme:  SCDs    ID:  JOSE    Endo:   FS goal 120-180    Patient seen and examined by attending on 6/28/2022.    Patient is critically ill due to endovascular coiling of carotid cavernous fistula and at high risk for neurological deterioration or death due to: potential complications from coiling of carotid cavernous fistula such as stroke, bleeding or cavernous sinus thrombosis, requiring close neurologic monitoring.

## 2022-06-28 NOTE — CHART NOTE - NSCHARTNOTEFT_GEN_A_CORE
Interventional Neuro Radiology  Pre-Procedure Note PA-C    HPI:  This is a 76 year old poor historian female with h/o cognitive impairment, accompanied with family presents for cerebral angiogram and embolization.  SHe reports that back in January 2022, right eyelid involuntary closed, she was complaining of blurry vision. 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  Pt denies any headaches, since her eyelid open, no N/V, no blurry vision. She is former smoker, h/o HTN, paroxysmal Afib (was on Eliquis until 2 months ago, unknown last echo, but will be evaluated by her cardiologist prior to surgery), hyperlipidemia, dementia, previous covid-19 infection (11/2021, mild symptoms, no hospitalization).      Allergies: No Known Allergies      PAST MEDICAL & SURGICAL HISTORY:  Atrial fibrillation  paroxysmal Afib-   was on Eliquis until 2 months ago per pt&#x27;s daughter    Hyperlipidemia    Hypertension    Glaucoma    COVID-19 vaccine series completed    Alzheimer&#x27;s dementia    Personal history of other diseases of the circulatory system    Breast nodule  right, benign per     2019 novel coronavirus disease (COVID-19)  11/2021   mild symptoms, no hospitalization    Carotid AV fistula    Assessment/Plan:   This is a 76y  year old female with right cavernous carotid fistula  Patient presents to neuro-IR for selective cerebral angiography and embolization   Procedure, goals, risks, benefits and alternatives  were discussed with patient and patient's family.  All questions were answered.  Risks include but are not limited to stroke, vessel injury, hemorrhage, and or right  groin hematoma.  Patient demonstrates understanding  of all risks involved with this procedure and wishes to continue.   Appropriate  consent was obtained from patient and consent is in the patient's chart. Interventional Neuro Radiology  Pre-Procedure Note PA-C    HPI:  This is a 76 year old poor historian female with h/o cognitive impairment, accompanied with family presents for cerebral angiogram and embolization.  SHe reports that back in January 2022, left eyelid involuntary closed, she was complaining of blurry vision. 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  Pt denies any headaches, since her eyelid open, no N/V, no blurry vision. She is former smoker, h/o HTN, paroxysmal Afib (was on Eliquis until 2 months ago, unknown last echo, but will be evaluated by her cardiologist prior to surgery), hyperlipidemia, dementia, previous covid-19 infection (11/2021, mild symptoms, no hospitalization).      Allergies: No Known Allergies      PAST MEDICAL & SURGICAL HISTORY:  Atrial fibrillation  paroxysmal Afib-   was on Eliquis until 2 months ago per pt&#x27;s daughter    Hyperlipidemia    Hypertension    Glaucoma    COVID-19 vaccine series completed    Alzheimer&#x27;s dementia    Personal history of other diseases of the circulatory system    Breast nodule  right, benign per     2019 novel coronavirus disease (COVID-19)  11/2021   mild symptoms, no hospitalization    Carotid AV fistula    Assessment/Plan:   This is a 76y  year old female with right cavernous carotid fistula  Patient presents to neuro-IR for selective cerebral angiography and embolization   Procedure, goals, risks, benefits and alternatives  were discussed with patient and patient's family.  All questions were answered.  Risks include but are not limited to stroke, vessel injury, hemorrhage, and or right  groin hematoma.  Patient demonstrates understanding  of all risks involved with this procedure and wishes to continue.   Appropriate  consent was obtained from patient and consent is in the patient's chart. Interventional Neuro Radiology  Pre-Procedure Note PA-C    HPI:  This is a 76 year old poor historian female with h/o cognitive impairment, accompanied with family presents for cerebral angiogram and embolization.  SHe reports that back in January 2022, left eyelid was drooping and she was complaining of blurry vision. 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 PMH HTN, paroxysmal Afib (was on Eliquis until 2 months ago, unknown last echo, but will be evaluated by her cardiologist prior to surgery), hyperlipidemia, dementia, previous covid-19 infection (11/2021, mild symptoms, no hospitalization). Today she is awake alert oriented x3.  No eye ptosis present, no sclera redness, no  blurry or double vision, EMOI intact.       Allergies: No Known Allergies      PAST MEDICAL & SURGICAL HISTORY:  Atrial fibrillation  paroxysmal Afib-   was on Eliquis until 2 months ago per pt&#x27;s daughter    Hyperlipidemia    Hypertension    Glaucoma    COVID-19 vaccine series completed    Alzheimer&#x27;s dementia    Personal history of other diseases of the circulatory system    Breast nodule  right, benign per     2019 novel coronavirus disease (COVID-19)  11/2021   mild symptoms, no hospitalization    Carotid AV fistula    Assessment/Plan:   This is a 76y  year old female with right cavernous carotid fistula  Patient presents to neuro-IR for selective cerebral angiography and embolization   Procedure, goals, risks, benefits and alternatives  were discussed with patient and patient's family.  All questions were answered.  Risks include but are not limited to stroke, vessel injury, hemorrhage, and or right  groin hematoma.  Patient demonstrates understanding  of all risks involved with this procedure and wishes to continue.   Appropriate  consent was obtained from patient and consent is in the patient's chart. Interventional Neuro Radiology  Pre-Procedure Note PA-C    HPI:  This is a 76 year old poor historian female with h/o cognitive impairment, accompanied with family presents for cerebral angiogram and embolization.  SHe reports that back in January 2022, left eyelid was drooping and she was complaining of blurry vision. 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 PMH HTN, paroxysmal Afib (was on Eliquis until 2 months ago, unknown last echo, but will be evaluated by her cardiologist prior to surgery), hyperlipidemia, dementia, previous covid-19 infection (11/2021, mild symptoms, no hospitalization). Today she is awake alert oriented x3.  No eye ptosis present, no sclera redness, no  blurry or double vision, EMOI intact.       Allergies: No Known Allergies      PAST MEDICAL & SURGICAL HISTORY:  Atrial fibrillation  paroxysmal Afib-   was on Eliquis until 2 months ago per pt&#x27;s daughter    Hyperlipidemia    Hypertension    Glaucoma    COVID-19 vaccine series completed    Alzheimer&#x27;s dementia    Personal history of other diseases of the circulatory system    Breast nodule  right, benign per     2019 novel coronavirus disease (COVID-19)  11/2021   mild symptoms, no hospitalization    Carotid AV fistula    Assessment/Plan:   This is a 76y  year old female with right cavernous carotid fistula  Patient presents to neuro-IR for selective cerebral angiography and embolization   Procedure, goals, risks, benefits and alternatives  were discussed with patient and patient's family.  All questions were answered.  Risks include but are not limited to stroke, vessel injury, hemorrhage, and or right  groin hematoma.  Patient demonstrates understanding  of all risks involved with this procedure and wishes to continue.   Appropriate  consent was obtained from patient;s family and consent is in the patient's chart.

## 2022-06-28 NOTE — CHART NOTE - NSCHARTNOTEFT_GEN_A_CORE
Interventional Neuro- Radiology   Procedure Note PA-C    Procedure: Selective Cerebral Angiography   Pre- Procedure Diagnosis:  Post- Procedure Diagnosis:    : Dr. Gwen Jaffe  Fellow: Dr. Lorene Elizondo  NP: Ewelina Aguilar    Sheath: Right artery- 6french Left vein- 6french    I/Os:  Estimated blood loss: less than 10cc  IV fluids: 400    cc     Urine output 350    cc        Contrast Omnipaque 240   141   cc             Vitals: /49         HR  72    Spo2   100 %       Preliminary Report:  Using a 6 Uzbek destination sheath to the right groin artery and 6 Uzbek bmx sheath to left femoral vein under general anesthesia via left vertebral artery,  left common carotid artery, left external carotid artery,  right common carotid artery, right internal carotid artery, right external carotid artery  a selective cerebral angiography and embolization of cavernous sinus fistula with coils was performed. ( Official note to follow).  Patient tolerated procedure well, hemodynamically stable, no change in neurological status compared to baseline.  Results discussed with neurosurgery, patient and patient's  family.  Groin sheath was removed, vascade device deployed bilaterally, manual compression held to hemostasis  for  21 minutes, no active bleeding, no hematoma,   quick clot and safeguard balloon dressing applied at 1915h.  [facility]

## 2022-06-28 NOTE — PRE-ANESTHESIA EVALUATION ADULT - NSANTHOSAYNRD_GEN_A_CORE
No. MALVIN screening performed.  STOP BANG Legend: 0-2 = LOW Risk; 3-4 = INTERMEDIATE Risk; 5-8 = HIGH Risk

## 2022-06-28 NOTE — ASU PATIENT PROFILE, ADULT - NSICDXPASTMEDICALHX_GEN_ALL_CORE_FT
PAST MEDICAL HISTORY:  2019 novel coronavirus disease (COVID-19) 11/2021   mild symptoms, no hospitalization    Alzheimer's dementia     Atrial fibrillation paroxysmal Afib-   was on Eliquis until 2 months ago per pt's daughter    Breast nodule right, benign per     Carotid AV fistula     COVID-19 vaccine series completed     Glaucoma     Hyperlipidemia     Hypertension     Personal history of other diseases of the circulatory system

## 2022-06-29 ENCOUNTER — TRANSCRIPTION ENCOUNTER (OUTPATIENT)
Age: 77
End: 2022-06-29

## 2022-06-29 VITALS
OXYGEN SATURATION: 99 % | TEMPERATURE: 99 F | RESPIRATION RATE: 17 BRPM | HEART RATE: 77 BPM | DIASTOLIC BLOOD PRESSURE: 68 MMHG | SYSTOLIC BLOOD PRESSURE: 115 MMHG

## 2022-06-29 PROCEDURE — 75894 X-RAYS TRANSCATH THERAPY: CPT

## 2022-06-29 PROCEDURE — 86901 BLOOD TYPING SEROLOGIC RH(D): CPT

## 2022-06-29 PROCEDURE — C1760: CPT

## 2022-06-29 PROCEDURE — 75898 FOLLOW-UP ANGIOGRAPHY: CPT

## 2022-06-29 PROCEDURE — 83735 ASSAY OF MAGNESIUM: CPT

## 2022-06-29 PROCEDURE — 36224 PLACE CATH CAROTD ART: CPT

## 2022-06-29 PROCEDURE — 61624 TCAT PERM OCCLS/EMBOLJ CNS: CPT

## 2022-06-29 PROCEDURE — 99233 SBSQ HOSP IP/OBS HIGH 50: CPT

## 2022-06-29 PROCEDURE — 70553 MRI BRAIN STEM W/O & W/DYE: CPT

## 2022-06-29 PROCEDURE — 84100 ASSAY OF PHOSPHORUS: CPT

## 2022-06-29 PROCEDURE — 97161 PT EVAL LOW COMPLEX 20 MIN: CPT

## 2022-06-29 PROCEDURE — 86900 BLOOD TYPING SEROLOGIC ABO: CPT

## 2022-06-29 PROCEDURE — 36415 COLL VENOUS BLD VENIPUNCTURE: CPT

## 2022-06-29 PROCEDURE — A9585: CPT

## 2022-06-29 PROCEDURE — C1887: CPT

## 2022-06-29 PROCEDURE — C1769: CPT

## 2022-06-29 PROCEDURE — 36227 PLACE CATH XTRNL CAROTID: CPT

## 2022-06-29 PROCEDURE — 70553 MRI BRAIN STEM W/O & W/DYE: CPT | Mod: 26

## 2022-06-29 PROCEDURE — 86850 RBC ANTIBODY SCREEN: CPT

## 2022-06-29 PROCEDURE — C2628: CPT

## 2022-06-29 PROCEDURE — C1894: CPT

## 2022-06-29 PROCEDURE — 36012 PLACE CATHETER IN VEIN: CPT

## 2022-06-29 PROCEDURE — 36226 PLACE CATH VERTEBRAL ART: CPT

## 2022-06-29 PROCEDURE — 85027 COMPLETE CBC AUTOMATED: CPT

## 2022-06-29 PROCEDURE — C9399: CPT

## 2022-06-29 PROCEDURE — C1889: CPT

## 2022-06-29 RX ORDER — QUETIAPINE FUMARATE 200 MG/1
25 TABLET, FILM COATED ORAL AT BEDTIME
Refills: 0 | Status: DISCONTINUED | OUTPATIENT
Start: 2022-06-29 | End: 2022-06-29

## 2022-06-29 RX ORDER — QUETIAPINE FUMARATE 200 MG/1
12.5 TABLET, FILM COATED ORAL EVERY 6 HOURS
Refills: 0 | Status: DISCONTINUED | OUTPATIENT
Start: 2022-06-29 | End: 2022-06-29

## 2022-06-29 RX ORDER — POLYETHYLENE GLYCOL 3350 17 G/17G
17 POWDER, FOR SOLUTION ORAL
Refills: 0 | Status: DISCONTINUED | OUTPATIENT
Start: 2022-06-29 | End: 2022-06-29

## 2022-06-29 RX ORDER — HALOPERIDOL DECANOATE 100 MG/ML
4 INJECTION INTRAMUSCULAR ONCE
Refills: 0 | Status: COMPLETED | OUTPATIENT
Start: 2022-06-29 | End: 2022-06-29

## 2022-06-29 RX ORDER — MAGNESIUM SULFATE 500 MG/ML
2 VIAL (ML) INJECTION ONCE
Refills: 0 | Status: COMPLETED | OUTPATIENT
Start: 2022-06-29 | End: 2022-06-29

## 2022-06-29 RX ORDER — SENNA PLUS 8.6 MG/1
2 TABLET ORAL AT BEDTIME
Refills: 0 | Status: DISCONTINUED | OUTPATIENT
Start: 2022-06-29 | End: 2022-06-29

## 2022-06-29 RX ORDER — SODIUM CHLORIDE 9 MG/ML
500 INJECTION INTRAMUSCULAR; INTRAVENOUS; SUBCUTANEOUS ONCE
Refills: 0 | Status: COMPLETED | OUTPATIENT
Start: 2022-06-29 | End: 2022-06-29

## 2022-06-29 RX ORDER — CHLORHEXIDINE GLUCONATE 213 G/1000ML
1 SOLUTION TOPICAL
Refills: 0 | Status: DISCONTINUED | OUTPATIENT
Start: 2022-06-29 | End: 2022-06-29

## 2022-06-29 RX ORDER — QUETIAPINE FUMARATE 200 MG/1
1 TABLET, FILM COATED ORAL
Qty: 0 | Refills: 0 | DISCHARGE

## 2022-06-29 RX ORDER — DEXMEDETOMIDINE HYDROCHLORIDE IN 0.9% SODIUM CHLORIDE 4 UG/ML
0.2 INJECTION INTRAVENOUS
Qty: 200 | Refills: 0 | Status: DISCONTINUED | OUTPATIENT
Start: 2022-06-29 | End: 2022-06-29

## 2022-06-29 RX ORDER — METOPROLOL TARTRATE 50 MG
1 TABLET ORAL
Qty: 0 | Refills: 0 | DISCHARGE
Start: 2022-06-29

## 2022-06-29 RX ORDER — HALOPERIDOL DECANOATE 100 MG/ML
2 INJECTION INTRAMUSCULAR EVERY 6 HOURS
Refills: 0 | Status: DISCONTINUED | OUTPATIENT
Start: 2022-06-29 | End: 2022-06-29

## 2022-06-29 RX ORDER — FAMOTIDINE 10 MG/ML
20 INJECTION INTRAVENOUS DAILY
Refills: 0 | Status: DISCONTINUED | OUTPATIENT
Start: 2022-06-29 | End: 2022-06-29

## 2022-06-29 RX ORDER — DONEPEZIL HYDROCHLORIDE 10 MG/1
1 TABLET, FILM COATED ORAL
Qty: 0 | Refills: 0 | DISCHARGE
Start: 2022-06-29

## 2022-06-29 RX ORDER — METOPROLOL TARTRATE 50 MG
1 TABLET ORAL
Qty: 0 | Refills: 0 | DISCHARGE

## 2022-06-29 RX ORDER — QUETIAPINE FUMARATE 200 MG/1
1 TABLET, FILM COATED ORAL
Qty: 0 | Refills: 0 | DISCHARGE
Start: 2022-06-29

## 2022-06-29 RX ORDER — DONEPEZIL HYDROCHLORIDE 10 MG/1
1 TABLET, FILM COATED ORAL
Qty: 0 | Refills: 0 | DISCHARGE

## 2022-06-29 RX ADMIN — Medication 4 MILLIGRAM(S): at 11:27

## 2022-06-29 RX ADMIN — Medication 25 GRAM(S): at 05:03

## 2022-06-29 RX ADMIN — SODIUM CHLORIDE 500 MILLILITER(S): 9 INJECTION INTRAMUSCULAR; INTRAVENOUS; SUBCUTANEOUS at 02:14

## 2022-06-29 RX ADMIN — HALOPERIDOL DECANOATE 4 MILLIGRAM(S): 100 INJECTION INTRAMUSCULAR at 01:05

## 2022-06-29 RX ADMIN — Medication 25 MILLIGRAM(S): at 05:04

## 2022-06-29 RX ADMIN — FAMOTIDINE 20 MILLIGRAM(S): 10 INJECTION INTRAVENOUS at 11:28

## 2022-06-29 RX ADMIN — DEXMEDETOMIDINE HYDROCHLORIDE IN 0.9% SODIUM CHLORIDE 2.72 MICROGRAM(S)/KG/HR: 4 INJECTION INTRAVENOUS at 01:04

## 2022-06-29 RX ADMIN — Medication 4 MILLIGRAM(S): at 05:04

## 2022-06-29 RX ADMIN — HALOPERIDOL DECANOATE 2 MILLIGRAM(S): 100 INJECTION INTRAMUSCULAR at 01:04

## 2022-06-29 RX ADMIN — SERTRALINE 50 MILLIGRAM(S): 25 TABLET, FILM COATED ORAL at 11:28

## 2022-06-29 NOTE — PROGRESS NOTE ADULT - ASSESSMENT
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     Neuro:  q2h NC  dexamethasone 4mg q6h  MRI w/wo today at 12   alzheimer's dementia   - c/w Aricept, sertraline   - Seroquel 25mgqhs, w/ 12.5mg q6 PRN    CV:  paroxysmal Afib (was on Eliquis until 2 months ago)  SBP goal 100-160      Pulm:  on RA    GI:  ADAT  famotidine 20mg qd   senna miralax      Renal:  Na goal  NS @ 70cc/hr     Heme:  SCDs  start dvt ppx after MRI SQL    ID:  JOSE    Endo:   FS goal 120-180    Patient seen and examined by attending on 6/28/2022.    Patient is critically ill due to endovascular coiling of carotid cavernous fistula and at high risk for neurological deterioration or death due to: potential complications from coiling of carotid cavernous fistula such as stroke, bleeding or cavernous sinus thrombosis, requiring close neurologic monitoring.            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     Neuro:  q2h NC  dexamethasone 4mg q6h  MRI w/wo today at 12   alzheimer's dementia   - c/w Aricept, sertraline   - Seroquel 25mgqhs, w/ 12.5mg q6 PRN    CV:  paroxysmal Afib (was on Eliquis until 2 months ago)  SBP goal 100-160      Pulm:  on RA    GI:  ADAT  famotidine 20mg qd   senna miralax      Renal:  Na goal  NS @ 70cc/hr     Heme:  SCDs  start dvt ppx after MRI SQL    ID:  JOSE    Endo:   FS goal 120-180    stable to d/c home after PT evaluation and if MRI w/o concerning abnormalities

## 2022-06-29 NOTE — PHYSICAL THERAPY INITIAL EVALUATION ADULT - STRENGTHENING, PT EVAL
GOAL: Pt will improve bilateral LE strength by one MMT grade, for increased limb stability, to improve gait and facilitate stair negotiation in 4 weeks.

## 2022-06-29 NOTE — DISCHARGE NOTE PROVIDER - CARE PROVIDER_API CALL
Alexis Umanzor)  Neurosurgery  [address], [address]  [place] [address]  Phone: [phone]  Fax: [phone]  Follow Up Time:    Gwen Solo)  Neurology; Vascular Neurology  [address] [place] [address]  Phone: [phone]  Fax: [phone]  Follow Up Time:

## 2022-06-29 NOTE — DISCHARGE NOTE PROVIDER - NSDCACTIVITY_GEN_ALL_CORE
Do not drive or operate machinery/Showering allowed/Walking - Indoors allowed/Walking - Outdoors allowed

## 2022-06-29 NOTE — DISCHARGE NOTE NURSING/CASE MANAGEMENT/SOCIAL WORK - NSDCPEFALRISK_GEN_ALL_CORE
For information on Fall & Injury Prevention, visit: [email or web address] OR  [email or web address] OR  https://www.cdc.gov/steadi/patient.html

## 2022-06-29 NOTE — DISCHARGE NOTE PROVIDER - HOSPITAL COURSE
76 year old poor history female with h/o cognitive impairment, accompanied with her , presents for preop testing for scheduled cerebral angiogram and embolization. Medical information obtained from Pt's daughter- Lurdes over the phone, she reports that back in January 2022, her mother's eyelid involuntary closed, she was complaining of blurry vision. 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 Pt denies any headaches, since her eyelid open, no N/V, no blurry vision. She is former smoker, h/o HTN, paroxysmal Afib (was on Eliquis until 2 months ago, unknown last echo, but will be evaluated by her cardiologist prior to surgery), hyperlipidemia, dementia, previous covid-19 infection (11/2021, mild symptoms, no hospitalization).  She denies any symptoms of covid-19, and scheduled for PCR test on 6/25/2022 at [facility].      Pt underwent elective angio embolization of left cavernous sinus fistula on 6/28. Pt had follow up MRI on 6/29 that demonstrates stability and no acute hemorrhage. As per PT, pt does not have any inpatient rehab needs. Pt will be sent home on a medrol dosepak and will follow up with Dr. Umanzor within 1-2 weeks of discharge.

## 2022-06-29 NOTE — DISCHARGE NOTE PROVIDER - NSDCCPCAREPLAN_GEN_ALL_CORE_FT
PRINCIPAL DISCHARGE DIAGNOSIS  Diagnosis: Carotid AV fistula  Assessment and Plan of Treatment:        PRINCIPAL DISCHARGE DIAGNOSIS  Diagnosis: Carotid AV fistula  Assessment and Plan of Treatment: Please follow up with Neurosurgeon as instructed. If you need to reschedule or make an appointment, Please call their office.  Your incision will be evaluated at this appointment.   You have a skin puncture on your groin where the procedure was done, please monitor for excessive bruising, bleeding, palpable mass under the site and seek medical advice if present.  Please return immediately to the ED for any of the following: fevers, bleeding, swelling, pain not relieved by medication, increased sluggishness or irritability, increased nausea or vomiting, inability to tolerate foods or liquids, increased numbness/tingling/ or inability to move extremities, seizures, chest pain, shortness of breathe.  You are being discharged on new medications; MEDROL DOSE ALMA.  This is for headache and any swelling caused by procedure.  Please make sure you follow the instructions both verbally told and on the prescription.  If you have any questions or concerns, please call the office.  Please continue all home medications.  Please follow up with your Primary Care Physician as it is important to keep them updated on your health. Please call their office to make appointment.

## 2022-06-29 NOTE — DISCHARGE NOTE PROVIDER - NSDCCPTREATMENT_GEN_ALL_CORE_FT
PRINCIPAL PROCEDURE  Procedure: Angiogram, cerebral, with embolization  Findings and Treatment: 6/28: Angio for embolization of left cavernous sinus fistula       PRINCIPAL PROCEDURE  Procedure: Angiogram, cerebral, with embolization  Findings and Treatment: 6/28: Angio for embolization of left cavernous sinus fistula  Follow up with Dr. Jaffe at scheduled appointment, address listed below:   [address] [place] [address] [phone]       PRINCIPAL PROCEDURE  Procedure: Angiogram, cerebral, with embolization  Findings and Treatment: 6/28: Angio for embolization of left cavernous sinus fistula.  You have a skin puncture on your groin where the procedure was done, please monitor for excessive bruising, bleeding, palpable mass under the site and seek medical advice if present.  Follow up with Dr. Jaffe at scheduled appointment, address listed below:   [address] [place] [address] [phone]

## 2022-06-29 NOTE — DISCHARGE NOTE PROVIDER - CARE PROVIDERS DIRECT ADDRESSES
[email or web address]

## 2022-06-29 NOTE — PHYSICAL THERAPY INITIAL EVALUATION ADULT - ADDITIONAL COMMENTS
Per , pt lives with spouse in a split-level home, 1 step to enter, +10 additional steps to second level.  reports 2 family members live on main level to assist if needed. Pt was amb (I) without an AD and (I) with all ADLs PTA. Pt +glasses. Pt's  reports he is able to assist pt at all times,  reports he drives.

## 2022-06-29 NOTE — PROGRESS NOTE ADULT - SUBJECTIVE AND OBJECTIVE BOX
Patient seen and examined at bedside.    --Anticoagulation--    T(C): 36.9 (06-29-22 @ 03:00), Max: 36.9 (06-29-22 @ 03:00)  HR: 79 (06-29-22 @ 04:00) (56 - 91)  BP: 106/81 (06-29-22 @ 04:00) (85/56 - 139/73)  RR: 15 (06-29-22 @ 04:00) (12 - 20)  SpO2: 98% (06-29-22 @ 04:00) (95% - 100%)  Wt(kg): --    Exam: AOx1-2 baseline   slight L6NP otherwise no CN deficit  groins c/d/i    Labs:                        12.0   7.82  )-----------( 165      ( 28 Jun 2022 20:53 )             35.6       Phos  4.9     06-28  Mg     1.8     06-28

## 2022-06-29 NOTE — PROGRESS NOTE ADULT - ASSESSMENT
76F hx dementia s/p embo L CCF w/ preop interm ptosis and L 6NP, slight 6NP post proc, otherwise stable.   - dex 4q6, protonix  - q1h neuro checks oN  - started on precedex  - mri wwo in AM  - PT p agitation

## 2022-06-29 NOTE — DISCHARGE NOTE NURSING/CASE MANAGEMENT/SOCIAL WORK - PATIENT PORTAL LINK FT
You can access the FollowMyHealth Patient Portal offered by [facility] by registering at the following website: [email or web address]. By joining [facility]’s FollowMyHealth portal, you will also be able to view your health information using other applications (apps) compatible with our system.

## 2022-06-29 NOTE — DISCHARGE NOTE PROVIDER - NSDCMRMEDTOKEN_GEN_ALL_CORE_FT
Aricept 10 mg oral tablet: 1 tab(s) orally once a day (at bedtime)  Centrum oral tablet: 1 tab(s) orally once a day  Crestor 10 mg oral tablet: 1 tab(s) orally once a day (at bedtime)  Medrol 4 mg oral tablet: Follow instructions. Take to completion. MDD:6   memantine 10 mg oral tablet: 1 tab(s) orally once a day  SEROquel 25 mg oral tablet: 1 tab(s) orally once a day (at bedtime)  sertraline 50 mg oral tablet: 1 tab(s) orally once a day  Toprol-XL 25 mg oral tablet, extended release: 1 tab(s) orally once a day

## 2022-06-29 NOTE — PROGRESS NOTE ADULT - SUBJECTIVE AND OBJECTIVE BOX
INTERVAL HISTORY: HPI:  76 year old poor history female with h/o cognitive impairment, accompanied with her , presents for preop testing for scheduled cerebral angiogram and embolization. Medical information obtained from Pt's daughter- Lurdes over the phone, she reports that back in 2022, her mother's eyelid involuntary closed, she was complaining of blurry vision. 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 Pt denies any headaches, since her eyelid open, no N/V, no blurry vision. She is former smoker, h/o HTN, paroxysmal Afib (was on Eliquis until 2 months ago, unknown last echo, but will be evaluated by her cardiologist prior to surgery), hyperlipidemia, dementia, previous covid-19 infection (2021, mild symptoms, no hospitalization).  She denies any symptoms of covid-19, and scheduled for PCR test on 2022 at [facility].   (2022 11:26)      MEDICATIONS  (STANDING):  atorvastatin 40 milliGRAM(s) Oral at bedtime  chlorhexidine 4% Liquid 1 Application(s) Topical <User Schedule>  dexAMETHasone  Injectable 4 milliGRAM(s) IV Push every 6 hours  dexMEDEtomidine Infusion 0.2 MICROgram(s)/kG/Hr (2.72 mL/Hr) IV Continuous <Continuous>  donepezil 10 milliGRAM(s) Oral at bedtime  metoprolol succinate ER 25 milliGRAM(s) Oral daily  QUEtiapine 25 milliGRAM(s) Oral at bedtime  sertraline 50 milliGRAM(s) Oral daily  sodium chloride 0.9%. 1000 milliLiter(s) (70 mL/Hr) IV Continuous <Continuous>    MEDICATIONS  (PRN):  haloperidol    Injectable 2 milliGRAM(s) IntraMuscular every 6 hours PRN Agitation      Drug Dosing Weight  Height (cm): 162.6 (2022 15:03)  Weight (kg): 54.4 (2022 15:03)  BMI (kg/m2): 20.6 (2022 15:03)  BSA (m2): 1.57 (2022 15:03)    PAST MEDICAL & SURGICAL HISTORY:  Atrial fibrillation  paroxysmal Afib-   was on Eliquis until 2 months ago per pt&#x27;s daughter  Hyperlipidemia  Hypertension  GlaucomaOVID-19 vaccine series completed  Alzheimer&#x27;s dementia  Personal history of other diseases of the circulatory system  Breast noduleright, benign per   2019 novel coronavirus disease (COVID-19)  2021   mild symptoms, no hospitalization  Carotid AV fistula  /O:         REVIEW OF SYSTEMS: [ ] Unable to Assess due to neurologic exam   [x] All ROS addressed below are non-contributory, except:  Neuro: [ ] Headache [ ] Back pain [ ] Numbness [ ] Weakness [ ] Ataxia [ ] Dizziness [ ] Aphasia [ ] Dysarthria [ ] Visual disturbance  Resp: [ ] Shortness of breath/dyspnea, [ ] Orthopnea [ ] Cough  CV: [ ] Chest pain [ ] Palpitation [ ] Lightheadedness [ ] Syncope  Renal: [ ] Thirst [ ] Edema  GI: [ ] Nausea [ ] Emesis [ ] Abdominal pain [ ] Constipation [ ] Diarrhea  Hem: [ ] Hematemesis [ ] bright red blood per rectum  ID: [ ] Fever [ ] Chills [ ] Dysuria  ENT: [ ] Rhinorrhea        ICU Vital Signs Last 24 Hrs  T(C): 37 (2022 07:00), Max: 37 (2022 07:00)  T(F): 98.6 (2022 07:00), Max: 98.6 (2022 07:00)  HR: 65 (2022 08:00) (56 - 91)  BP: 107/63 (2022 08:00) (85/56 - 139/73)  BP(mean): 77 (2022 08:00) (67 - 107)  ABP: 134/66 (2022 00:00) (134/66 - 158/84)  ABP(mean): 94 (2022 00:00) (91 - 114)  RR: 12 (2022 08:00) (11 - 20)  SpO2: 98% (2022 08:00) (95% - 100%)      I&O's Detail    2022 07:01  -  2022 07:00  --------------------------------------------------------  IN:    Dexmedetomidine: 27.2 mL    sodium chloride 0.9%: 910 mL    Sodium Chloride 0.9% Bolus: 1000 mL  Total IN: 1937.2 mL    OUT:    Indwelling Catheter - Urethral (mL): 345 mL  Total OUT: 345 mL    Total NET: 1592.2 mL      2022 07:01  -  2022 09:58  --------------------------------------------------------  IN:    Dexmedetomidine: 6.8 mL  Total IN: 6.8 mL    OUT:  Total OUT: 0 mL    Total NET: 6.8 mL    LABS:  CBC Full  -  ( 2022 20:53 )  WBC Count : 7.82 K/uL  RBC Count : 4.37 M/uL  Hemoglobin : 12.0 g/dL  Hematocrit : 35.6 %  Platelet Count - Automated : 165 K/uL  Mean Cell Volume : 81.5 fl  Mean Cell Hemoglobin : 27.5 pg  Mean Cell Hemoglobin Concentration : 33.7 gm/dL  Auto Neutrophil # : x  Auto Lymphocyte # : x  Auto Monocyte # : x  Auto Eosinophil # : x  Auto Basophil # : x  Auto Neutrophil % : x  Auto Lymphocyte % : x  Auto Monocyte % : x  Auto Eosinophil % : x  Auto Basophil % : x      Phos  4.9       Mg     1.8                 RADIOLOGY & ADDITIONAL STUDIES:

## 2022-06-29 NOTE — PHYSICAL THERAPY INITIAL EVALUATION ADULT - PERTINENT HX OF CURRENT PROBLEM, REHAB EVAL
Pt is a 77 y/o F with h/o cognitive impairment, HTN, HLD, a-fib of Eliquis, who was found to have a L carotid-cavernous fistula in the setting of intermittent L eye ptosis and diplopia since January, now s/p endovascular coiling +27 coils.

## 2022-06-29 NOTE — DISCHARGE NOTE NURSING/CASE MANAGEMENT/SOCIAL WORK - NSDCPEPTSTROKESIGNS_GEN_ALL_CORE
Sudden numbness or weakness of the face, arm, or leg, especially on one side of the body. Confusion, trouble speaking or understanding. Trouble seeing in one or both eyes. Trouble walking, dizziness, loss of balance or coordination. Severe headache.
1 pair

## 2022-07-11 PROBLEM — I77.0 ARTERIOVENOUS FISTULA, ACQUIRED: Chronic | Status: ACTIVE | Noted: 2022-06-21

## 2022-07-11 PROBLEM — U07.1 COVID-19: Chronic | Status: ACTIVE | Noted: 2022-06-21

## 2022-07-15 ENCOUNTER — APPOINTMENT (OUTPATIENT)
Dept: NEUROSURGERY | Facility: CLINIC | Age: 77
End: 2022-07-15

## 2022-07-15 DIAGNOSIS — I77.0 ARTERIOVENOUS FISTULA, ACQUIRED: ICD-10-CM

## 2022-07-15 PROCEDURE — 99213 OFFICE O/P EST LOW 20 MIN: CPT

## 2022-07-15 RX ORDER — ALPRAZOLAM 0.25 MG/1
0.25 TABLET ORAL
Qty: 20 | Refills: 0 | Status: ACTIVE | COMMUNITY
Start: 2022-02-09

## 2022-07-15 RX ORDER — METOPROLOL TARTRATE 25 MG/1
25 TABLET, FILM COATED ORAL
Qty: 180 | Refills: 0 | Status: ACTIVE | COMMUNITY
Start: 2021-11-22

## 2022-07-15 RX ORDER — METHYLPREDNISOLONE 4 MG/1
4 TABLET ORAL
Qty: 21 | Refills: 0 | Status: ACTIVE | COMMUNITY
Start: 2022-06-29

## 2022-07-15 NOTE — HISTORY OF PRESENT ILLNESS
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  \par Past medical history includes AF on Eliquis, dementia, HLD.\par \par Patient presents is under the care of neurology - Dr. Hawk. for management of dementia and stroke management. \par Patient does not endorse any headaches, n/v.  No loss of strength or gait disturbances. \par [place] [facility] [place] eye ptosis- stroke workup initiated. No acute findings.\par  \par Patient was evaluated by ophthalmology ([place]) \par She is under the care of Dr. Durbin (cardiology) AF on Eliquis 5 mg BID 5+ years.  There is discussion of discontinuance per daughter.  She is wearing a Holter monitor for 4 weeks to assess if there is  a role for discontinuation of her Eliquis for AF. \par PMD- [facility]- [facility]. \par \par \par She denies any pain referable to groin site.  Denies any headaches, n/v.  Vision changes include\par Right eye strabismus..  No new episodes of left eye ptosis.  Denies any other neurological complaints.Memory is stable. \par Non smoker. \par No pain referable to groin site.

## 2022-07-15 NOTE — DATA REVIEWED
[de-identified] : ACC: [id number] EXAM: IR PROCEDURE NEURO\par \par PROCEDURE DATE: 06/28/2022\par \par \par \par INTERPRETATION: Pre-procedure diagnosis: Carotid cavernous fistula\par \par Post-procedure diagnosis: Complete obliteration of carotid cavernous fistula with coils\par \par \par Procedure: Cerebral angiography and venography for successful embolization of carotid cavernous fistula with coils\par \par Interventionalist: Gwen Jaffe MD\par  Alexis Umanzor MD\par \par Fellow: Gopi Elizondo MD\par \par Assistant: Soumya Roblero NP\par \par Anesthesiologist: Imelda Mark MD\par \par Contrast: Omnipaque 240, 141 cc\par \par Radiation Dose: A: 65.4 min, 1836 mGy B: 53 min, 840 mGy Total: 118.4 min, 2676 mGy\par \par Indications: The patient is a 76 years old female with past medical history of atrial fibrillation on Eliquis, hyperlipidemia, hypertension and mild dementia, who experienced intermittent left eye ptosis for 2 months. Patient was being followed by Neurologist who ordered and MRI of the brain and MR angiography concerning for possible carotid cavernous fistula. She underwent formal evaluation with cerebral angiography which confirmed MR findings. The patient now presents for cerebral angiography and embolization of the carotid cavernous fistula. The risks, benefits, alternatives, complications and personnel associated with the procedure was discussed with the patient and the patient's family in great detail. They request that we proceed.\par \par Procedure: The patient was brought into the angiography suite table supine. The patient was intubated and placed under general anesthesia. An arterial line and the Dennis catheter were inserted. Both femoral regions were prepped and draped in the standard sterile fashion. The left femoral vein was accessed using a micropuncture kit and modified Seldinger technique. A 6 Tuvaluan 90cm BMX sheath was inserted and attached to a heparinized flush.The right femoral artery was accessed using a micropuncture kit and modified Seldinger technique. A 6 Tuvaluan Destination sheath was inserted and attached to a heparinized flush. Heparin 5000 Units was administered to achieve and ACT of 324 seconds from a baseline ACT of 152 seconds. A 5 Tuvaluan diagnostic Cordis vertebral catheter over an angled Glidewire was navigated into the left common, internal and external carotid artery and angiography of the intracranial and extracranial circulation was performed. Milrinone 4mg was administered intra-arterially into the left internal carotid artery to prevent iatrogenic vasospasm. A rotational angiogram was performed of the left common carotid artery circulation. Three-dimensional reconstructed images were evaluated and interpreted by me on an independent [employer] XWP workstation in order to obtain working angles and iPilot navigation.\par \par The Destination sheath was advanced over the diagnostic catheter into the left internal carotid artery and attached to a Tuohy-Jimbo adapter and a heparinized flush. The diagnostic catheter was removed. A 6mm x 20mm Eclipse 2L balloon microcatheter over a Synchro2 microwire was advanced into the cavernous segment of the left internal carotid artery. Under guided roadmap via the left femoral vein 6 Tuvaluan BMX sheath a 6 Tuvaluan Cordis Neuron Select over an angled Glidewire was navigated into the left jugular vein and venogram was performed. The diagnostic catheter was removed. An Wheatcroft 14 microcatheter over a Synchro 2 Support microwire was navigated into the left cavernous sinus into the intercavernous portion and venography was performed. Under guided roadmap a Headway 21 microcatheter over a Synchro 2 microwire was navigated across the cavernous sinus into the contralateral portion of the sinus. A venogram was performed. Via the Wheatcroft 14 microcatheter position at the intercavernous sinus multiple coils were deployed from distal to proximal portion of the left cavernous sinus. A total of 27 coils were deployed. The Eclipse balloon microcatheter was removed. The distal access Headway 21 microcatheter was removed.\par \par Post-procedure angiography of the left common, internal and external carotid artery and venography was performed. The Wheatcroft 14 microcatheter was removed. Post procedure angiography of the right internal and external carotid artery was performed via the 6 Tuvaluan Destination sheath a 5 Tuvaluan diagnostic Cordis vertebral catheter over an angled Glidewire was navigated into the right internal and external carotid artery and angiography of the intracranial and extracranial circulation was performed. The catheter was navigated then into the left vertebral artery and angiography of the intracranial circulation was performed. The diagnostic catheter was removed. A right femoral artery angiogram was performed via the destination sheath. The right femoral artery was deemed of sufficient caliber for a closure device. All catheters and guidewires were removed. Hemostasis was obtained with 6 Tuvaluan Vascade manual compression, Quickclot and the Safeguard dressing. The left femoral BMX was removed and hemostasis was obtained with manual compression, Quickclot and the Safeguard dressing.\par \par The patient was extubated and transferred to the neurosurgical intensive care unit in stable condition.\par \par Findings:\par \par Pre-procedure:\par \par Cerebral angiography:\par \par  Left common, internal and external carotid artery including 3D interpretation: Immediate arteriovenous shunting is visualized from the meningohypophyseal trunk emptying directly into the cavernous sinus. There is some reflux into the superior petrosal sinus with early filling or the transverse-sigmoid sinus. There is no filling across the anterior communicating artery. A fetal origin of the left posterior cerebral artery is identified. There is no evidence of intracranial aneurysm. Multiple superficial cortical veins are identified. The superior sagittal sinus drains into both transverse and sigmoid sinuses. The vein of Essie is visualized. The basal vein of Jack is identified. The internal cerebral vein drains into the great vein of Torito and the straight sinus. The sylvian venous system drains via middle superficial cerebral vein into the cavernous, pterygoid venous plexus and inferior petrosal sinus. There is immediate arteriovenous shunting from middle meningeal (sphenoidal branches), accessory middle meningeal and sphenopalatine artery (artery of foramen rotundum) being the main contributors to the cavernous sinus fistula.\par \par Venography:\par \par Left jugular vein: There is opacification of the left jugular vein, sigmoid and proximal left transverse sinus. An emissary vein is visualized. There is no evidence of contrast extravasation or vascular injury.\par \par Left cavernous sinus: There is opacification across the intercavernous sinus with visualization of the bilateral cavernous sinus. There is no evidence of contrast extravasation or vascular injury.\par \par Right cavernous sinus: There is visualization of the distal portion of the right cavernous sinus draining into inferior petrosal. There is no evidence of contrast extravasation or vascular injury.\par \par Post-procedure:\par \par Cerebral angiography:\par \par Left common, internal and external carotid artery: There is no evidence of arteriovenous shunting. There is normal transit of contrast through the arterial, capillary and venous phases. A dense coil mass extends from the intercavernous portion to the left cavernous sinus and mid portion of the inferior petrosal sinus. There is no evidence of contrast extravasation, vascular injury or thromboembolic complications. There is normal transit of contrast through the left external carotid circulation without evidence of arteriovenous shunting or dural fistula.\par \par Right internal and external carotid artery: There is no evidence of arteriovenous shunting. There is normal transit of contrast through the arterial, capillary and venous phases. A dense coil mass extends from the intercavernous portion to the left cavernous sinus and mid portion of the inferior petrosal sinus. There is no evidence of contrast extravasation, vascular injury or thromboembolic complications. There is normal transit of contrast through the right external carotid circulation without evidence of arteriovenous shunting or dural fistula.\par \par Left vertebral artery: There is normal transit of contrast through the arterial, capillary and venous phases. There is no reflux into the contralateral vertebral artery. The basilar artery terminates in a both superior cerebellar arteries. There is no evidence of intracranial aneurysm, arteriovenous malformation or arteriovenous shunting. The posterior circulation drains into the straight sinus and both transverse and sigmoid sinuses.\par \par Right femoral artery: There is normal transit of contrast through the right femoral artery without evidence of contrast extravasation, pseudoaneurysm or vascular injury.\par \par Venography:\par \par Left cavernous sinus: There is a dense mass of coils. The left cavernous sinus was not opacified. There is visualization of the distal inferior petrosal sinuses.\par \par Impression: Complete obliteration of carotid cavernous fistula with coils

## 2022-07-15 NOTE — ASSESSMENT
[FreeTextEntry1] : Impression: Complete obliteration of carotid cavernous fistula with coils\par \par \par My plan for this patient is for her to obtain a fistula treatment neuro-ophthalmology exam to assess her pre-existing left eye ptosis and strabismus and her new right eyes strabismus.\par In the interim I have provided her a Medrol Dosepak to see if it alleviates the symptoms.\par Patient has been given an opportunity to ask and have their questions answered to their satisfaction.\par Patient knows to call the office if there are any new or worsening symptoms.\par

## 2022-07-27 ENCOUNTER — NON-APPOINTMENT (OUTPATIENT)
Age: 77
End: 2022-07-27

## 2022-07-27 ENCOUNTER — APPOINTMENT (OUTPATIENT)
Dept: OPHTHALMOLOGY | Facility: CLINIC | Age: 77
End: 2022-07-27

## 2022-07-27 PROCEDURE — 92012 INTRM OPH EXAM EST PATIENT: CPT

## 2022-09-28 ENCOUNTER — APPOINTMENT (OUTPATIENT)
Dept: OPHTHALMOLOGY | Facility: CLINIC | Age: 77
End: 2022-09-28

## 2024-01-28 ENCOUNTER — TRANSCRIPTION ENCOUNTER (OUTPATIENT)
Age: 79
End: 2024-01-28

## 2024-02-06 ENCOUNTER — TRANSCRIPTION ENCOUNTER (OUTPATIENT)
Age: 79
End: 2024-02-06

## 2024-02-09 ENCOUNTER — TRANSCRIPTION ENCOUNTER (OUTPATIENT)
Age: 79
End: 2024-02-09

## 2024-02-26 ENCOUNTER — TRANSCRIPTION ENCOUNTER (OUTPATIENT)
Age: 79
End: 2024-02-26

## 2024-02-27 ENCOUNTER — TRANSCRIPTION ENCOUNTER (OUTPATIENT)
Age: 79
End: 2024-02-27